# Patient Record
Sex: MALE | Race: WHITE | Employment: OTHER | URBAN - METROPOLITAN AREA
[De-identification: names, ages, dates, MRNs, and addresses within clinical notes are randomized per-mention and may not be internally consistent; named-entity substitution may affect disease eponyms.]

---

## 2018-01-26 ENCOUNTER — PATIENT OUTREACH (OUTPATIENT)
Dept: FAMILY MEDICINE CLINIC | Facility: CLINIC | Age: 63
End: 2018-01-26

## 2019-02-04 ENCOUNTER — PATIENT OUTREACH (OUTPATIENT)
Dept: FAMILY MEDICINE CLINIC | Facility: CLINIC | Age: 64
End: 2019-02-04

## 2019-03-27 ENCOUNTER — TELEPHONE (OUTPATIENT)
Dept: FAMILY MEDICINE CLINIC | Facility: CLINIC | Age: 64
End: 2019-03-27

## 2019-06-20 ENCOUNTER — TELEPHONE (OUTPATIENT)
Dept: FAMILY MEDICINE CLINIC | Facility: CLINIC | Age: 64
End: 2019-06-20

## 2019-06-20 NOTE — TELEPHONE ENCOUNTER
Patient is scheduled for a Pre-Op visit on 07/08/19 at 11:15 am.     Pre-Op: Hand injection on last week of July at [address] [facility] by Dr Princess Khalil,       MDs office will fax over pre-op orders.

## 2019-07-03 ENCOUNTER — OFFICE VISIT (OUTPATIENT)
Dept: FAMILY MEDICINE CLINIC | Facility: CLINIC | Age: 64
End: 2019-07-03
Payer: MEDICARE

## 2019-07-03 VITALS
SYSTOLIC BLOOD PRESSURE: 130 MMHG | DIASTOLIC BLOOD PRESSURE: 70 MMHG | BODY MASS INDEX: 29.18 KG/M2 | HEIGHT: 70 IN | RESPIRATION RATE: 20 BRPM | WEIGHT: 203.81 LBS | HEART RATE: 68 BPM | TEMPERATURE: 98 F

## 2019-07-03 DIAGNOSIS — M79.641 BILATERAL HAND PAIN: ICD-10-CM

## 2019-07-03 DIAGNOSIS — M79.642 BILATERAL HAND PAIN: ICD-10-CM

## 2019-07-03 DIAGNOSIS — M72.0 DUPUYTREN'S CONTRACTURE OF BOTH HANDS: Primary | ICD-10-CM

## 2019-07-03 DIAGNOSIS — Z01.818 PREOP EXAMINATION: ICD-10-CM

## 2019-07-03 PROCEDURE — 99214 OFFICE O/P EST MOD 30 MIN: CPT | Performed by: FAMILY MEDICINE

## 2019-07-03 PROCEDURE — 93000 ELECTROCARDIOGRAM COMPLETE: CPT | Performed by: FAMILY MEDICINE

## 2019-07-03 RX ORDER — DIAZEPAM 5 MG/1
TABLET ORAL
Refills: 0 | COMMUNITY
Start: 2019-05-20 | End: 2019-08-21 | Stop reason: ALTCHOICE

## 2019-07-03 RX ORDER — HYDROCODONE BITARTRATE AND ACETAMINOPHEN 5; 325 MG/1; MG/1
TABLET ORAL
Refills: 0 | COMMUNITY
Start: 2019-05-21 | End: 2019-08-21 | Stop reason: ALTCHOICE

## 2019-07-03 NOTE — PROGRESS NOTES
Abbie Kehr is a 66year old male who presents for a pre-operative physical exam. Patient is to have Xyloflex injections and lysis of adhesions of Dupytrens contracture,under anesthesia  to be done by Dr. Pallavi De Leon at [facility]  In July.       H Sitting, Cuff Size: large)   Pulse 68   Temp 98.1 °F (36.7 °C) (Temporal)   Resp 20   Ht 70\"   Wt 203 lb 12.8 oz   BMI 29.24 kg/m²   GENERAL: well developed, well nourished,in no apparent distress  SKIN: no rashes,no suspicious lesions  HEENT: atraumatic,

## 2019-07-15 ENCOUNTER — TELEPHONE (OUTPATIENT)
Dept: FAMILY MEDICINE CLINIC | Facility: CLINIC | Age: 64
End: 2019-07-15

## 2019-08-21 ENCOUNTER — OFFICE VISIT (OUTPATIENT)
Dept: FAMILY MEDICINE CLINIC | Facility: CLINIC | Age: 64
End: 2019-08-21
Payer: MEDICARE

## 2019-08-21 VITALS
RESPIRATION RATE: 16 BRPM | HEART RATE: 84 BPM | SYSTOLIC BLOOD PRESSURE: 130 MMHG | WEIGHT: 200.63 LBS | TEMPERATURE: 98 F | DIASTOLIC BLOOD PRESSURE: 80 MMHG | BODY MASS INDEX: 29 KG/M2

## 2019-08-21 DIAGNOSIS — J02.9 PHARYNGITIS, UNSPECIFIED ETIOLOGY: ICD-10-CM

## 2019-08-21 DIAGNOSIS — J01.20 ACUTE NON-RECURRENT ETHMOIDAL SINUSITIS: Primary | ICD-10-CM

## 2019-08-21 DIAGNOSIS — J34.89 SINUS PAIN: ICD-10-CM

## 2019-08-21 PROCEDURE — 99214 OFFICE O/P EST MOD 30 MIN: CPT | Performed by: FAMILY MEDICINE

## 2019-08-21 RX ORDER — METHYLPREDNISOLONE 4 MG/1
TABLET ORAL
Qty: 1 KIT | Refills: 0 | Status: SHIPPED | OUTPATIENT
Start: 2019-08-21 | End: 2020-01-30 | Stop reason: ALTCHOICE

## 2019-08-21 RX ORDER — AMOXICILLIN AND CLAVULANATE POTASSIUM 500; 125 MG/1; MG/1
1 TABLET, FILM COATED ORAL 2 TIMES DAILY
Qty: 20 TABLET | Refills: 0 | Status: SHIPPED | OUTPATIENT
Start: 2019-08-21 | End: 2019-08-31

## 2019-08-21 NOTE — PROGRESS NOTES
HPI:   Wilberto Zhang is a 66year old male who presents for upper respiratory symptoms for  1  weeks. Patient reports sore throat, congestion, dry cough, sinus pain.       Current Outpatient Medications:  Amoxicillin-Pot Clavulanate (AUGMENTIN) 500-125 MG Ora mucinex bid    Meds & Refills for this Visit:  Requested Prescriptions     Signed Prescriptions Disp Refills   • Amoxicillin-Pot Clavulanate (AUGMENTIN) 500-125 MG Oral Tab 20 tablet 0     Sig: Take 1 tablet by mouth 2 (two) times daily for 10 days.    • me

## 2019-08-27 ENCOUNTER — PATIENT OUTREACH (OUTPATIENT)
Dept: FAMILY MEDICINE CLINIC | Facility: CLINIC | Age: 64
End: 2019-08-27

## 2020-01-20 ENCOUNTER — PATIENT OUTREACH (OUTPATIENT)
Dept: FAMILY MEDICINE CLINIC | Facility: CLINIC | Age: 65
End: 2020-01-20

## 2020-01-30 ENCOUNTER — OFFICE VISIT (OUTPATIENT)
Dept: FAMILY MEDICINE CLINIC | Facility: CLINIC | Age: 65
End: 2020-01-30
Payer: COMMERCIAL

## 2020-01-30 VITALS
RESPIRATION RATE: 16 BRPM | TEMPERATURE: 98 F | SYSTOLIC BLOOD PRESSURE: 128 MMHG | HEIGHT: 69 IN | HEART RATE: 72 BPM | BODY MASS INDEX: 30.46 KG/M2 | DIASTOLIC BLOOD PRESSURE: 78 MMHG | WEIGHT: 205.63 LBS

## 2020-01-30 DIAGNOSIS — Z00.00 ENCOUNTER FOR MEDICARE ANNUAL WELLNESS EXAM: ICD-10-CM

## 2020-01-30 DIAGNOSIS — I65.23 CAROTID STENOSIS, BILATERAL: ICD-10-CM

## 2020-01-30 DIAGNOSIS — E78.2 MIXED HYPERLIPIDEMIA: ICD-10-CM

## 2020-01-30 DIAGNOSIS — M72.0 DUPUYTREN'S CONTRACTURE OF BOTH HANDS: ICD-10-CM

## 2020-01-30 DIAGNOSIS — Z00.00 ENCOUNTER FOR ANNUAL HEALTH EXAMINATION: ICD-10-CM

## 2020-01-30 DIAGNOSIS — K76.0 FATTY LIVER DISEASE, NONALCOHOLIC: ICD-10-CM

## 2020-01-30 DIAGNOSIS — Z13.6 SCREENING FOR CARDIOVASCULAR CONDITION: ICD-10-CM

## 2020-01-30 DIAGNOSIS — Z00.00 MEDICARE ANNUAL WELLNESS VISIT, INITIAL: Primary | ICD-10-CM

## 2020-01-30 DIAGNOSIS — Z11.59 NEED FOR HEPATITIS C SCREENING TEST: ICD-10-CM

## 2020-01-30 DIAGNOSIS — Z11.4 SCREENING FOR HIV (HUMAN IMMUNODEFICIENCY VIRUS): ICD-10-CM

## 2020-01-30 PROCEDURE — 96160 PT-FOCUSED HLTH RISK ASSMT: CPT | Performed by: FAMILY MEDICINE

## 2020-01-30 PROCEDURE — G0439 PPPS, SUBSEQ VISIT: HCPCS | Performed by: FAMILY MEDICINE

## 2020-01-30 PROCEDURE — 99396 PREV VISIT EST AGE 40-64: CPT | Performed by: FAMILY MEDICINE

## 2020-01-30 NOTE — PATIENT INSTRUCTIONS
Gamal Arguello's SCREENING SCHEDULE   Tests on this list are recommended by your physician but may not be covered, or covered at this frequency, by your insurer. Please check with your insurance carrier before scheduling to verify coverage.     PREVENTATIVE years No results found for this or any previous visit. No flowsheet data found. Fecal Occult Blood   Covered Annually No results found for: FOB, OCCULTSTOOL No flowsheet data found.      Barium Enema-   uncomfortable but covered  Covered but uncomfortab pharmacy  prescription benefits     Recommended Websites for Advanced Directives    [email or web address] pdf  An information packet, including necessary form from the [employer] website.      [email or web address] needed at [facility], and non-screening if indicated for medical reasons    Electrocardiogram date07/03/2019 Routine EKG is not a screening covered service except at the Welcome to Medicare Visit    Abdominal aortic aneurysm screening (once betwee birthday    Hepatitis B for Moderate/High Risk No orders found for this or any previous visit.  Medium/high risk factors:   End-stage renal disease   Hemophiliacs who received Factor VIII or IX concentrates   Clients of institutions for the mentally retarde

## 2020-01-30 NOTE — PROGRESS NOTES
HPI:   Joseph Canavan is a 60year old male who presents for a Medicare Initial Annual Wellness visit (Once after 12 month Medicare anniversary) .     Alverto Ashford is here for his [address],  He has has some ear issues and has some \" bumps\" and stye in his eyes, he has (Family Practice)    Patient Active Problem List:     Carotid stenosis, bilateral     Fatty liver disease, nonalcoholic     Hyperlipidemia     Dupuytren's contracture of both hands     Screening for HIV (human immunodeficiency virus)    Wt Readings from [email or web address] of allergy or asthma    EXAM:   /78 (BP Location: Right arm, Patient Position: Sitting, Cuff Size: large)   Pulse 72   Temp 98.2 °F (36.8 °C) (Temporal)   Resp 16   Ht 69\"   Wt 205 lb 9.6 oz (93.3 kg)   BMI 30.36 kg/m²   Estimated body mass index is and fingers, atraumatic, no cyanosis or edema   Pulses: 2+ and symmetric   Skin: Skin color, texture, turgor normal, no rashes or lesions, see above   Lymph nodes: Cervical, supraclavicular, and axillary nodes normal   Neurologic: Normal            Vaccina patient indicates understanding of these issues and agrees to the plan. Reinforced healthy diet, lifestyle, and exercise. Return in 6 months (on 7/30/2020).      Ivanna Clark DO, 1/30/2020     General Health     In the past six months, have you lost mo after 65 No vaccine history found Please get once after your 65th birthday    Pneumococcal 23 (Pneumovax)  Covered Once after 65 No vaccine history found Please get once after your 65th birthday    Hepatitis B for Moderate/High Risk No vaccine history foun

## 2020-03-02 ENCOUNTER — TELEPHONE (OUTPATIENT)
Dept: FAMILY MEDICINE CLINIC | Facility: CLINIC | Age: 65
End: 2020-03-02

## 2020-03-04 ENCOUNTER — TELEPHONE (OUTPATIENT)
Dept: FAMILY MEDICINE CLINIC | Facility: CLINIC | Age: 65
End: 2020-03-04

## 2020-03-04 DIAGNOSIS — S62.512A CLOSED DISPLACED FRACTURE OF PROXIMAL PHALANX OF LEFT THUMB, INITIAL ENCOUNTER: Primary | ICD-10-CM

## 2020-03-04 NOTE — TELEPHONE ENCOUNTER
Marlyn Juárez from Dr. Paola Vaz office called- pt needs referral to be seen today    In office for L Thumb Fracture    Routing to provider- please route back to Rn to Fax [phone]

## 2020-03-04 NOTE — TELEPHONE ENCOUNTER
Pt stopped in, he has changed from Medicare to [facility].   So-we put in the referral for Dr. Kelley Bustillos, now pt states he needs an order from Dr. Albert Gomez to Dr. Kelley Bustillos for a customized splint for thumb, as long as the splint will be covered by pts insurance

## 2020-03-05 NOTE — TELEPHONE ENCOUNTER
Pt called back today and was VERY UPSET. Pt said he was told DS would be calling him at the end of the day. This never happened, said he said at home all night waiting for this call. Pt spent 10 minutes on the phone with me expressing his anger about this.

## 2020-03-05 NOTE — TELEPHONE ENCOUNTER
Left message that we do not order specialty orthotics and braces, should be done by Ortho that he saw , Dr. Princess Khalil, their office was contacted and they will be ordering

## 2020-03-05 NOTE — TELEPHONE ENCOUNTER
Amy Reyes from [facility]. States she needs a referral for hand splint made by their occupational hand therapist.    Leroy Moraes that our office does referral for evaluation and treatment.  Any treatment they recommend their office will ne

## 2020-03-06 ENCOUNTER — TELEPHONE (OUTPATIENT)
Dept: FAMILY MEDICINE CLINIC | Facility: CLINIC | Age: 65
End: 2020-03-06

## 2020-03-06 NOTE — TELEPHONE ENCOUNTER
Ammon Johnson from [facility] office called. They are asking for a referral for pt to be seen.  Please call back at [phone] Left a message for patient to call the GI office to schedule the procedure.

## 2020-03-07 LAB
ABSOLUTE BASOPHILS: 43 CELLS/UL (ref 0–200)
ABSOLUTE EOSINOPHILS: 159 CELLS/UL (ref 15–500)
ABSOLUTE LYMPHOCYTES: 1665 CELLS/UL (ref 850–3900)
ABSOLUTE MONOCYTES: 519 CELLS/UL (ref 200–950)
ABSOLUTE NEUTROPHILS: 3715 CELLS/UL (ref 1500–7800)
ALBUMIN/GLOBULIN RATIO: 1.5 (CALC) (ref 1–2.5)
ALBUMIN: 4.3 G/DL (ref 3.6–5.1)
ALKALINE PHOSPHATASE: 67 U/L (ref 35–144)
ALT: 33 U/L (ref 9–46)
AST: 22 U/L (ref 10–35)
BASOPHILS: 0.7 %
BILIRUBIN, TOTAL: 0.5 MG/DL (ref 0.2–1.2)
BUN: 16 MG/DL (ref 7–25)
CALCIUM: 9.4 MG/DL (ref 8.6–10.3)
CARBON DIOXIDE: 25 MMOL/L (ref 20–32)
CHLORIDE: 103 MMOL/L (ref 98–110)
CHOL/HDLC RATIO: 5.3 (CALC)
CHOLESTEROL, TOTAL: 174 MG/DL
CREATININE: 0.79 MG/DL (ref 0.7–1.25)
EGFR IF AFRICN AM: 112 ML/MIN/1.73M2
EGFR IF NONAFRICN AM: 96 ML/MIN/1.73M2
EOSINOPHILS: 2.6 %
GLOBULIN: 2.9 G/DL (CALC) (ref 1.9–3.7)
GLUCOSE: 169 MG/DL (ref 65–99)
HDL CHOLESTEROL: 33 MG/DL
HEMATOCRIT: 43.9 % (ref 38.5–50)
HEMOGLOBIN: 15.6 G/DL (ref 13.2–17.1)
LYMPHOCYTES: 27.3 %
MCH: 32.2 PG (ref 27–33)
MCHC: 35.5 G/DL (ref 32–36)
MCV: 90.7 FL (ref 80–100)
MONOCYTES: 8.5 %
MPV: 10.8 FL (ref 7.5–12.5)
NEUTROPHILS: 60.9 %
NON-HDL CHOLESTEROL: 141 MG/DL (CALC)
PLATELET COUNT: 231 THOUSAND/UL (ref 140–400)
POTASSIUM: 4.7 MMOL/L (ref 3.5–5.3)
PROTEIN, TOTAL: 7.2 G/DL (ref 6.1–8.1)
PSA, TOTAL: 0.6 NG/ML
RDW: 12.7 % (ref 11–15)
RED BLOOD CELL COUNT: 4.84 MILLION/UL (ref 4.2–5.8)
SIGNAL TO CUT-OFF: 0.03
SODIUM: 136 MMOL/L (ref 135–146)
TRIGLYCERIDES: 407 MG/DL
TSH W/REFLEX TO FT4: 1.42 MIU/L (ref 0.4–4.5)
WHITE BLOOD CELL COUNT: 6.1 THOUSAND/UL (ref 3.8–10.8)

## 2020-03-09 ENCOUNTER — MED REC SCAN ONLY (OUTPATIENT)
Dept: FAMILY MEDICINE CLINIC | Facility: CLINIC | Age: 65
End: 2020-03-09

## 2020-03-10 ENCOUNTER — TELEPHONE (OUTPATIENT)
Dept: FAMILY MEDICINE CLINIC | Facility: CLINIC | Age: 65
End: 2020-03-10

## 2020-03-10 DIAGNOSIS — R73.09 ELEVATED GLUCOSE: Primary | ICD-10-CM

## 2020-03-10 NOTE — TELEPHONE ENCOUNTER
Notify Dave Hood Emerado  labs looked very good, except for a couple of things his Tchol looked good BUT HIS TG were really high 407, they should 150 or less  Had   Excellent kidney, liver function, thyroid, and prostate  were all normal.  His Hepatitis C was

## 2020-03-10 NOTE — TELEPHONE ENCOUNTER
Pt was advised of results verbalized understanding    Pt states he would like to go [facility] for labs - [facility] [place]

## 2020-03-14 ENCOUNTER — TELEPHONE (OUTPATIENT)
Dept: FAMILY MEDICINE CLINIC | Facility: CLINIC | Age: 65
End: 2020-03-14

## 2020-03-14 DIAGNOSIS — E11.9 TYPE 2 DIABETES MELLITUS WITHOUT COMPLICATION, WITHOUT LONG-TERM CURRENT USE OF INSULIN (HCC): Primary | ICD-10-CM

## 2020-03-14 LAB — HEMOGLOBIN A1C: 7.1 % OF TOTAL HGB

## 2020-03-14 NOTE — TELEPHONE ENCOUNTER
Notes recorded by Citlalli Cope DO on 3/14/2020 at 7:08 AM CDT  He also needs to be on something to lower his cholesterol/ TG if he agrees we can start some tricor.  Also he should get his eyes check to make sure he has no diabetic retina issues  ------

## 2020-03-14 NOTE — TELEPHONE ENCOUNTER
Patient returned call. Patient notified and verbalized understanding. States he will research the cholesterol medication and let DS know next week if he wants to start it due to s/e to several meds.     Patient is agreeable to diabetic education  Order p

## 2020-03-16 ENCOUNTER — TELEPHONE (OUTPATIENT)
Dept: ENDOCRINOLOGY CLINIC | Facility: CLINIC | Age: 65
End: 2020-03-16

## 2020-03-16 NOTE — TELEPHONE ENCOUNTER
Pt states he doesn't want to take any medications    He stopped drinking soda as of Saturday- and he wants to start taking Triple Garlic a day    Pt staes he will be visiting the [facility] area and will be doing a lot more exercise and that will be goo

## 2020-03-18 ENCOUNTER — TELEPHONE (OUTPATIENT)
Dept: FAMILY MEDICINE CLINIC | Facility: CLINIC | Age: 65
End: 2020-03-18

## 2020-03-18 NOTE — TELEPHONE ENCOUNTER
cologuard testing came back negative- pt good for 3 years    Pt advised of results- verbalized understanding    Updated in Epic- sent to scanning

## 2020-03-19 ENCOUNTER — MED REC SCAN ONLY (OUTPATIENT)
Dept: FAMILY MEDICINE CLINIC | Facility: CLINIC | Age: 65
End: 2020-03-19

## 2020-05-06 ENCOUNTER — TELEPHONE (OUTPATIENT)
Dept: FAMILY MEDICINE CLINIC | Facility: CLINIC | Age: 65
End: 2020-05-06

## 2020-05-06 NOTE — TELEPHONE ENCOUNTER
Pt declines using computer/smart phone for video visit. Is trying to quit sweets. However, he did let me speak to his girlfriend Angelica. Emailed him an invitation to sign up for [employer].  Will call them again on Friday to see if they've established a

## 2020-05-06 NOTE — TELEPHONE ENCOUNTER
Fax from [facility] requesting additional Dx codes for labs drawn 3/6/2020    Additional Dx code E11.9 DM2 and E78.2 hyperlipidemia added    Form faxed back

## 2020-05-08 NOTE — TELEPHONE ENCOUNTER
Attempt #2, girlfriend Roxana Armandos hasn't had a chance to set up [employer] yet. Will try him again Tues or Wed.

## 2020-05-13 NOTE — TELEPHONE ENCOUNTER
[facility] and sched video visit once gf Farndy Napoles gets him set up on [employer] bab[employer]. Third/final attempt.

## 2020-06-18 ENCOUNTER — TELEPHONE (OUTPATIENT)
Dept: FAMILY MEDICINE CLINIC | Facility: CLINIC | Age: 65
End: 2020-06-18

## 2020-08-18 ENCOUNTER — MED REC SCAN ONLY (OUTPATIENT)
Dept: FAMILY MEDICINE CLINIC | Facility: CLINIC | Age: 65
End: 2020-08-18

## 2020-08-18 DIAGNOSIS — H35.30 MACULAR DEGENERATION OF BOTH EYES, UNSPECIFIED TYPE: Primary | ICD-10-CM

## 2020-09-14 ENCOUNTER — TELEPHONE (OUTPATIENT)
Dept: FAMILY MEDICINE CLINIC | Facility: CLINIC | Age: 65
End: 2020-09-14

## 2020-09-14 DIAGNOSIS — H25.13 NUCLEAR SCLEROTIC CATARACT OF BOTH EYES: ICD-10-CM

## 2020-09-14 DIAGNOSIS — H35.9 UNSPECIFIED RETINAL DISORDER: Primary | ICD-10-CM

## 2020-09-14 NOTE — TELEPHONE ENCOUNTER
Pt is going to the office this Friday for consult visit    Was referred by [facility] for Unspecified retinal disorder H 35.9

## 2020-09-23 ENCOUNTER — TELEPHONE (OUTPATIENT)
Dept: FAMILY MEDICINE CLINIC | Facility: CLINIC | Age: 65
End: 2020-09-23

## 2020-09-23 NOTE — TELEPHONE ENCOUNTER
Jh Barone from [facility] called, checking status of the referral for pt's visit with Dr. Carolene Runner on 9/18/20. Please call Jh Barone at [phone].    E#[phone]

## 2020-09-28 NOTE — TELEPHONE ENCOUNTER
Tita from Dr[facility] office called, still need the referral.  Please call Gildardo Arroyo at [phone].

## 2020-09-28 NOTE — TELEPHONE ENCOUNTER
RN spoke with Gonzalo Balderas- verified fax number    She is going to fax back receipt  To confirm it was recieved

## 2020-11-17 ENCOUNTER — TELEPHONE (OUTPATIENT)
Dept: FAMILY MEDICINE CLINIC | Facility: CLINIC | Age: 65
End: 2020-11-17

## 2020-11-17 DIAGNOSIS — M72.0 DUPUYTREN'S CONTRACTURE OF BOTH HANDS: Primary | ICD-10-CM

## 2020-11-17 NOTE — TELEPHONE ENCOUNTER
Pt called to see if he needs labs- looks like we needed to recheck A1c- if other labs are needed please advise

## 2020-11-17 NOTE — TELEPHONE ENCOUNTER
PT CALLED AND ADV NEEDS NEW REFERRAL FOR DR Lorna Segundo. PT HAS UP COMING APT ON 12/2    PLEASE CALL IF ANY ISSUES.     Mady Long

## 2020-12-01 ENCOUNTER — NURSE ONLY (OUTPATIENT)
Dept: FAMILY MEDICINE CLINIC | Facility: CLINIC | Age: 65
End: 2020-12-01
Payer: COMMERCIAL

## 2020-12-01 DIAGNOSIS — R73.09 ELEVATED GLUCOSE: ICD-10-CM

## 2020-12-01 DIAGNOSIS — Z00.00 ENCOUNTER FOR MEDICARE ANNUAL WELLNESS EXAM: ICD-10-CM

## 2020-12-01 DIAGNOSIS — Z11.4 SCREENING FOR HIV (HUMAN IMMUNODEFICIENCY VIRUS): ICD-10-CM

## 2020-12-01 DIAGNOSIS — Z13.6 SCREENING FOR CARDIOVASCULAR CONDITION: ICD-10-CM

## 2020-12-01 DIAGNOSIS — Z00.00 ENCOUNTER FOR ANNUAL HEALTH EXAMINATION: ICD-10-CM

## 2020-12-01 DIAGNOSIS — Z11.59 NEED FOR HEPATITIS C SCREENING TEST: ICD-10-CM

## 2020-12-01 PROCEDURE — 87389 HIV-1 AG W/HIV-1&-2 AB AG IA: CPT | Performed by: FAMILY MEDICINE

## 2020-12-01 PROCEDURE — 80061 LIPID PANEL: CPT | Performed by: FAMILY MEDICINE

## 2020-12-01 PROCEDURE — 84443 ASSAY THYROID STIM HORMONE: CPT | Performed by: FAMILY MEDICINE

## 2020-12-01 PROCEDURE — 83721 ASSAY OF BLOOD LIPOPROTEIN: CPT | Performed by: FAMILY MEDICINE

## 2020-12-01 PROCEDURE — 83036 HEMOGLOBIN GLYCOSYLATED A1C: CPT | Performed by: FAMILY MEDICINE

## 2020-12-01 PROCEDURE — 80053 COMPREHEN METABOLIC PANEL: CPT | Performed by: FAMILY MEDICINE

## 2020-12-01 PROCEDURE — 85025 COMPLETE CBC W/AUTO DIFF WBC: CPT | Performed by: FAMILY MEDICINE

## 2020-12-01 PROCEDURE — 86803 HEPATITIS C AB TEST: CPT | Performed by: FAMILY MEDICINE

## 2020-12-01 NOTE — PROGRESS NOTES
Pt here for blood work ordered by DS. 2 golds, 1 light green, 1 pink & 1 purple were drawn from right arm. Pt left in good condition.

## 2020-12-02 ENCOUNTER — TELEPHONE (OUTPATIENT)
Dept: FAMILY MEDICINE CLINIC | Facility: CLINIC | Age: 65
End: 2020-12-02

## 2020-12-02 DIAGNOSIS — E78.2 MIXED HYPERLIPIDEMIA: Primary | ICD-10-CM

## 2020-12-02 DIAGNOSIS — E11.9 TYPE 2 DIABETES MELLITUS WITHOUT COMPLICATION, WITHOUT LONG-TERM CURRENT USE OF INSULIN (HCC): ICD-10-CM

## 2020-12-02 RX ORDER — ATORVASTATIN CALCIUM 10 MG/1
10 TABLET, FILM COATED ORAL NIGHTLY
Qty: 30 TABLET | Refills: 3 | Status: SHIPPED | OUTPATIENT
Start: 2020-12-02 | End: 2021-01-01 | Stop reason: WASHOUT

## 2020-12-02 NOTE — TELEPHONE ENCOUNTER
Pt states he is taking nothing for diabetes and pt is managing cholesterol with garlic and a vitamin pack    He states he bought the generic garlic pack this time.   RN states last year triglycerides were 407 and this year they are 445- Pt then agreed that

## 2020-12-02 NOTE — TELEPHONE ENCOUNTER
----- Message from Earl Landrum DO sent at 12/2/2020 10:27 AM CST -----  Cna notify Idalia Olivera, his HIV and Hepatitis C are negative, his kidney liver, thyroid and prostate all looked good , his BS control is not terrible at 7.2, his his cholesterol was not ve

## 2020-12-02 NOTE — TELEPHONE ENCOUNTER
Pt was advised of recommendation below- verbalized understanding    Is agreeable to starting Statin medication.     Pt already struggle with leg cramps- wants DS to be aware of that    Jim loja 34/47

## 2020-12-02 NOTE — TELEPHONE ENCOUNTER
SO his TChol, because his a diabetic should be 170 or less, and his LDL should be 70 or less, and his TG should be closer to 150, unfortunately there are not supplements that are going to accomplish that for him.  And that's why we use statins, to help decr

## 2020-12-03 ENCOUNTER — TELEPHONE (OUTPATIENT)
Dept: FAMILY MEDICINE CLINIC | Facility: CLINIC | Age: 65
End: 2020-12-03

## 2020-12-03 NOTE — TELEPHONE ENCOUNTER
PT CALLED AND SCHEDULED PRE-OP W/DR ON 12/15/20.  PT HAVING HAND PROCEDURE WITH DR Laurel Gonzalez -NO DATE IS SET (WILL BE IN December)    [address]

## 2020-12-03 NOTE — TELEPHONE ENCOUNTER
He is asking if another doctor tp do a pre op physical for him he is able to have the surgery on Dec.8 please advise  He is available anytime on Friday to come in

## 2020-12-03 NOTE — TELEPHONE ENCOUNTER
Pt called to advise that he will not be moving up his surgery. He said disregard the request to see another MD for pre op.      He will keep his original pre op appointment    Future Appointments   Date Time Provider Sonia Patel   12/15/2020 11:00 AM

## 2020-12-15 ENCOUNTER — OFFICE VISIT (OUTPATIENT)
Dept: FAMILY MEDICINE CLINIC | Facility: CLINIC | Age: 65
End: 2020-12-15
Payer: COMMERCIAL

## 2020-12-15 ENCOUNTER — MED REC SCAN ONLY (OUTPATIENT)
Dept: FAMILY MEDICINE CLINIC | Facility: CLINIC | Age: 65
End: 2020-12-15

## 2020-12-15 VITALS
WEIGHT: 205.81 LBS | BODY MASS INDEX: 30.48 KG/M2 | HEART RATE: 75 BPM | DIASTOLIC BLOOD PRESSURE: 70 MMHG | SYSTOLIC BLOOD PRESSURE: 130 MMHG | HEIGHT: 69 IN | TEMPERATURE: 97 F | OXYGEN SATURATION: 95 %

## 2020-12-15 DIAGNOSIS — Z01.818 PREOP EXAMINATION: Primary | ICD-10-CM

## 2020-12-15 DIAGNOSIS — M72.0 DUPUYTREN'S CONTRACTURE OF BOTH HANDS: ICD-10-CM

## 2020-12-15 DIAGNOSIS — M72.0 CONTRACTURE OF PALMAR FASCIA: ICD-10-CM

## 2020-12-15 DIAGNOSIS — M72.0 DUPUYTREN'S CONTRACTURE: ICD-10-CM

## 2020-12-15 PROBLEM — M79.609 PAIN IN SOFT TISSUES OF LIMB: Status: ACTIVE | Noted: 2020-03-04

## 2020-12-15 PROCEDURE — 3008F BODY MASS INDEX DOCD: CPT | Performed by: FAMILY MEDICINE

## 2020-12-15 PROCEDURE — 99214 OFFICE O/P EST MOD 30 MIN: CPT | Performed by: FAMILY MEDICINE

## 2020-12-15 PROCEDURE — 3075F SYST BP GE 130 - 139MM HG: CPT | Performed by: FAMILY MEDICINE

## 2020-12-15 PROCEDURE — 3078F DIAST BP <80 MM HG: CPT | Performed by: FAMILY MEDICINE

## 2020-12-15 PROCEDURE — 93000 ELECTROCARDIOGRAM COMPLETE: CPT | Performed by: FAMILY MEDICINE

## 2020-12-15 NOTE — PROGRESS NOTES
Humberto Joseph is a 63year old male who presents for a pre-operative physical exam. Patient is to have Xiaflex injection, MP PIP joint left ring finger and repair tendon left thumb, to be done by Dr. Irene Cates at [facility] headaches  PSYCHE: denies depression or anxiety  HEMATOLOGIC: denies hx of anemia  ENDOCRINE: denies thyroid history  ALL/ASTHMA: denies hx of allergy or asthma    EXAM:   /70   Pulse 75   Temp 97 °F (36.1 °C) (Temporal)   Ht 5' 9\" (1.753 m)   Wt 20

## 2020-12-23 ENCOUNTER — TELEPHONE (OUTPATIENT)
Dept: FAMILY MEDICINE CLINIC | Facility: CLINIC | Age: 65
End: 2020-12-23

## 2020-12-23 DIAGNOSIS — M72.0 CONTRACTURE OF PALMAR FASCIA: ICD-10-CM

## 2020-12-23 DIAGNOSIS — M72.0 DUPUYTREN'S CONTRACTURE OF BOTH HANDS: Primary | ICD-10-CM

## 2020-12-23 NOTE — TELEPHONE ENCOUNTER
[facility] faxed over order for pt to have PT- Dr. Jorge Olsen put in referral     Fax sent to scanning

## 2021-01-06 ENCOUNTER — MED REC SCAN ONLY (OUTPATIENT)
Dept: FAMILY MEDICINE CLINIC | Facility: CLINIC | Age: 66
End: 2021-01-06

## 2021-01-15 ENCOUNTER — TELEPHONE (OUTPATIENT)
Dept: FAMILY MEDICINE CLINIC | Facility: CLINIC | Age: 66
End: 2021-01-15

## 2021-01-15 NOTE — TELEPHONE ENCOUNTER
Pt called he is wanting to know if he should go to  refill of  atorvastatin 10 MG Oral Tab ? He said the dr wanted to make sure the medication was working, he was in last month to do labs work.      Pt advised  is out of office

## 2021-01-15 NOTE — TELEPHONE ENCOUNTER
Patient advised of the information per . Patient verbalized understanding. He has a refill ready at the pharmacy. He will pick it up.

## 2021-04-01 ENCOUNTER — TELEPHONE (OUTPATIENT)
Dept: FAMILY MEDICINE CLINIC | Facility: CLINIC | Age: 66
End: 2021-04-01

## 2021-04-14 PROCEDURE — 3051F HG A1C>EQUAL 7.0%<8.0%: CPT | Performed by: FAMILY MEDICINE

## 2021-04-15 ENCOUNTER — TELEPHONE (OUTPATIENT)
Dept: FAMILY MEDICINE CLINIC | Facility: CLINIC | Age: 66
End: 2021-04-15

## 2021-04-15 DIAGNOSIS — E11.9 TYPE 2 DIABETES MELLITUS WITHOUT COMPLICATION, WITHOUT LONG-TERM CURRENT USE OF INSULIN (HCC): Primary | ICD-10-CM

## 2021-04-15 DIAGNOSIS — E78.2 MIXED HYPERLIPIDEMIA: ICD-10-CM

## 2021-04-15 RX ORDER — ATORVASTATIN CALCIUM 10 MG/1
TABLET, FILM COATED ORAL
COMMUNITY
Start: 2021-03-22 | End: 2021-04-26

## 2021-04-15 NOTE — TELEPHONE ENCOUNTER
Pt was advised of results- verbalized understanding    Pt states he is not consistent with taking medication- pt will try more to take medication consistently.     Future orders placed with recall

## 2021-04-15 NOTE — TELEPHONE ENCOUNTER
----- Message from Rosalinda Roldan DO sent at 4/15/2021  9:33 AM CDT -----  Can notify His A1c looks really good, his lipids looked good except for his TG were still really high, is he still taking the atorvastatin?  Lets recall A1c in 6 months along with lip

## 2021-04-15 NOTE — TELEPHONE ENCOUNTER
External labs received from [facility]    Entered as external results- copy sent to scanning

## 2021-04-26 RX ORDER — ATORVASTATIN CALCIUM 10 MG/1
TABLET, FILM COATED ORAL
Qty: 30 TABLET | Refills: 2 | Status: SHIPPED | OUTPATIENT
Start: 2021-04-26 | End: 2021-09-02

## 2021-04-26 NOTE — TELEPHONE ENCOUNTER
Cholesterol Medication Protocol Atuzoz6404/26/2021 03:11 AM   ALT < 80 Protocol Details    ALT resulted within past year     Lipid panel within past 12 months     Appointment within past 12 or next 3 months      Refilled per protocol.

## 2021-04-29 ENCOUNTER — TELEPHONE (OUTPATIENT)
Dept: FAMILY MEDICINE CLINIC | Facility: CLINIC | Age: 66
End: 2021-04-29

## 2021-05-17 ENCOUNTER — TELEPHONE (OUTPATIENT)
Dept: FAMILY MEDICINE CLINIC | Facility: CLINIC | Age: 66
End: 2021-05-17

## 2021-05-17 NOTE — TELEPHONE ENCOUNTER
Reached patient for medication adherence consult. Patient is past due for refill on atorvastatin. He tells me he is currently traveling but does still have some atorvastatin left and is still taking it.  Patient does admit to sometimes forgetting or mis [Abdomen Masses] : No abdominal masses [Abdomen Tenderness] : ~T No ~M abdominal tenderness [No HSM] : no hepatosplenomegaly [JVD] : no jugular venous distention  [Normal Breath Sounds] : Normal breath sounds [Normal Heart Sounds] : normal heart sounds [No Rash or Lesion] : No rash or lesion

## 2021-06-08 ENCOUNTER — TELEPHONE (OUTPATIENT)
Dept: FAMILY MEDICINE CLINIC | Facility: CLINIC | Age: 66
End: 2021-06-08

## 2021-06-09 ENCOUNTER — OFFICE VISIT (OUTPATIENT)
Dept: FAMILY MEDICINE CLINIC | Facility: CLINIC | Age: 66
End: 2021-06-09
Payer: COMMERCIAL

## 2021-06-09 VITALS
HEIGHT: 70 IN | HEART RATE: 76 BPM | BODY MASS INDEX: 29.44 KG/M2 | TEMPERATURE: 98 F | SYSTOLIC BLOOD PRESSURE: 134 MMHG | DIASTOLIC BLOOD PRESSURE: 70 MMHG | WEIGHT: 205.63 LBS | RESPIRATION RATE: 18 BRPM

## 2021-06-09 DIAGNOSIS — I65.23 CAROTID STENOSIS, BILATERAL: ICD-10-CM

## 2021-06-09 DIAGNOSIS — E78.2 MIXED HYPERLIPIDEMIA: ICD-10-CM

## 2021-06-09 DIAGNOSIS — Z00.00 MEDICARE ANNUAL WELLNESS VISIT, SUBSEQUENT: Primary | ICD-10-CM

## 2021-06-09 DIAGNOSIS — Z12.5 PROSTATE CANCER SCREENING: ICD-10-CM

## 2021-06-09 DIAGNOSIS — Z00.00 ENCOUNTER FOR ANNUAL HEALTH EXAMINATION: ICD-10-CM

## 2021-06-09 DIAGNOSIS — K76.0 FATTY LIVER DISEASE, NONALCOHOLIC: ICD-10-CM

## 2021-06-09 PROCEDURE — 99396 PREV VISIT EST AGE 40-64: CPT | Performed by: FAMILY MEDICINE

## 2021-06-09 PROCEDURE — 96160 PT-FOCUSED HLTH RISK ASSMT: CPT | Performed by: FAMILY MEDICINE

## 2021-06-09 PROCEDURE — 3008F BODY MASS INDEX DOCD: CPT | Performed by: FAMILY MEDICINE

## 2021-06-09 PROCEDURE — 3075F SYST BP GE 130 - 139MM HG: CPT | Performed by: FAMILY MEDICINE

## 2021-06-09 PROCEDURE — 3078F DIAST BP <80 MM HG: CPT | Performed by: FAMILY MEDICINE

## 2021-06-09 PROCEDURE — G0439 PPPS, SUBSEQ VISIT: HCPCS | Performed by: FAMILY MEDICINE

## 2021-06-09 NOTE — PROGRESS NOTES
HPI:   Dixie Muro is a 63year old male who presents for a Medicare Subsequent Annual Wellness visit (Pt already had Initial Annual Wellness).     [place] is here for his [address], he had lipids done in April that showed his total and LDL to be good but his T oz (93.3 kg)  08/21/19 : 200 lb 9.6 oz (91 kg)     Last Cholesterol Labs:   Lab Results   Component Value Date    CHOLEST 173 04/14/2021    HDL 33 04/14/2021    LDL  12/01/2020      Comment:      Unable to calculate LDL due to Triglycerides >400.0 mg/dL denies thyroid history  ALL/ASTHMA: denies hx of allergy or asthma    EXAM:   There were no vitals taken for this visit. Estimated body mass index is 30.39 kg/m² as calculated from the following:    Height as of 12/15/20: 5' 9\" (1.753 m).     Weight as of presents for a Medicare Assessment.      PLAN SUMMARY:   Diagnoses and all orders for this visit:    Medicare annual wellness visit, subsequent  -     PSA SCREEN; Future  -     COMP METABOLIC PANEL (14)  -     TSH W REFLEX TO FREE T4  -     CBC WITH DIFFERE (H) 12/01/2020        Cardiovascular Disease Screening    Lipid Panel  Cholesterol  Lipoprotein (HDL)  Triglycerides Covered every 5 years for all Medicare beneficiaries without apparent signs or symptoms of cardiovascular disease Lab Results   Component V Pertusis TD and TDaP Not covered by Medicare Part B -  No recommendations at this time    Zoster Not covered by Medicare Part B; may be covered with your pharmacy  prescription benefits -  Zoster Vaccines(1 of 2) Never done       Diabetes      Hemoglobin A

## 2021-06-09 NOTE — PATIENT INSTRUCTIONS
[address] SCREENING SCHEDULE   Tests on this list are recommended by your physician but may not be covered, or covered at this frequency, by your insurer. Please check with your insurance carrier before scheduling to verify coverage.    PREVENTATI due on 12/01/2022   Immunizations    Influenza Covered once per flu season  Please get every year -  No recommendations at this time    Pneumococcal Each vaccine ([id number] & Spkudzqab75) covered once after 65 Prevnar 13: -    Pnayleeqv03: -     Pneumococc computer and printer. (the forms are also available in [place]  www. putitinwriting. org  This link also has information from the [facility] regarding Advance Directives.

## 2021-07-09 ENCOUNTER — TELEPHONE (OUTPATIENT)
Dept: FAMILY MEDICINE CLINIC | Facility: CLINIC | Age: 66
End: 2021-07-09

## 2021-07-09 NOTE — TELEPHONE ENCOUNTER
Overdue result letter mailed to patient   Lab Frequency Next Occurrence   HEMOGLOBIN A1C Once 10/15/2021   LIPID PANEL Once 10/15/2021   MICROALB/CREAT RATIO, RANDOM URINE Once 10/15/2021   PSA SCREEN Once 06/09/2021

## 2021-08-06 PROCEDURE — 3052F HG A1C>EQUAL 8.0%<EQUAL 9.0%: CPT | Performed by: FAMILY MEDICINE

## 2021-08-09 ENCOUNTER — TELEPHONE (OUTPATIENT)
Dept: FAMILY MEDICINE CLINIC | Facility: CLINIC | Age: 66
End: 2021-08-09

## 2021-08-10 ENCOUNTER — TELEPHONE (OUTPATIENT)
Dept: FAMILY MEDICINE CLINIC | Facility: CLINIC | Age: 66
End: 2021-08-10

## 2021-08-10 LAB
ABSOLUTE BASOPHILS: 69 CELLS/UL (ref 0–200)
ABSOLUTE EOSINOPHILS: 198 CELLS/UL (ref 15–500)
ABSOLUTE LYMPHOCYTES: 2726 CELLS/UL (ref 850–3900)
ABSOLUTE MONOCYTES: 628 CELLS/UL (ref 200–950)
ABSOLUTE NEUTROPHILS: 4979 CELLS/UL (ref 1500–7800)
ALBUMIN/GLOBULIN RATIO: 1.4 (CALC) (ref 1–2.5)
ALBUMIN: 4.6 G/DL (ref 3.6–5.1)
ALKALINE PHOSPHATASE: 72 U/L (ref 35–144)
ALT: 44 U/L (ref 9–46)
AST: 31 U/L (ref 10–35)
BASOPHILS: 0.8 %
BILIRUBIN, TOTAL: 0.8 MG/DL (ref 0.2–1.2)
BUN: 13 MG/DL (ref 7–25)
CALCIUM: 9.7 MG/DL (ref 8.6–10.3)
CARBON DIOXIDE: 23 MMOL/L (ref 20–32)
CHLORIDE: 101 MMOL/L (ref 98–110)
CREATININE: 0.81 MG/DL (ref 0.7–1.25)
EGFR IF AFRICN AM: 110 ML/MIN/1.73M2
EGFR IF NONAFRICN AM: 95 ML/MIN/1.73M2
EOSINOPHILS: 2.3 %
GLOBULIN: 3.2 G/DL (CALC) (ref 1.9–3.7)
GLUCOSE: 181 MG/DL (ref 65–99)
HEMATOCRIT: 48.1 % (ref 38.5–50)
HEMOGLOBIN A1C: 8 % OF TOTAL HGB
HEMOGLOBIN: 16.5 G/DL (ref 13.2–17.1)
LYMPHOCYTES: 31.7 %
MCH: 31.6 PG (ref 27–33)
MCHC: 34.3 G/DL (ref 32–36)
MCV: 92.1 FL (ref 80–100)
MONOCYTES: 7.3 %
MPV: 11 FL (ref 7.5–12.5)
NEUTROPHILS: 57.9 %
PLATELET COUNT: 245 THOUSAND/UL (ref 140–400)
POTASSIUM: 4.4 MMOL/L (ref 3.5–5.3)
PROTEIN, TOTAL: 7.8 G/DL (ref 6.1–8.1)
RDW: 12.8 % (ref 11–15)
RED BLOOD CELL COUNT: 5.22 MILLION/UL (ref 4.2–5.8)
SODIUM: 136 MMOL/L (ref 135–146)
TSH W/REFLEX TO FT4: 2.42 MIU/L (ref 0.4–4.5)
WHITE BLOOD CELL COUNT: 8.6 THOUSAND/UL (ref 3.8–10.8)

## 2021-08-10 NOTE — TELEPHONE ENCOUNTER
----- Message from Ainsley Tam DO sent at 8/10/2021  8:13 AM CDT -----  Britt Clements his A1c went up to 8.0, which means his BS average is running  160-180, which is too high. I think we need to get him started on something before it gets too high.  Also

## 2021-08-11 NOTE — TELEPHONE ENCOUNTER
Pt was advised of recommendation below- was agreeable to starting Metformin    RN sent to pharmacy    Future orders placed with recall

## 2021-08-16 ENCOUNTER — OFFICE VISIT (OUTPATIENT)
Dept: FAMILY MEDICINE CLINIC | Facility: CLINIC | Age: 66
End: 2021-08-16
Payer: COMMERCIAL

## 2021-08-16 VITALS
BODY MASS INDEX: 29 KG/M2 | WEIGHT: 205.19 LBS | DIASTOLIC BLOOD PRESSURE: 70 MMHG | RESPIRATION RATE: 24 BRPM | SYSTOLIC BLOOD PRESSURE: 122 MMHG | TEMPERATURE: 99 F | HEART RATE: 92 BPM

## 2021-08-16 DIAGNOSIS — M72.2 DUPUYTREN'S CONTRACTURE OF FOOT: ICD-10-CM

## 2021-08-16 DIAGNOSIS — L89.890 PRESSURE INJURY OF TOE OF LEFT FOOT, UNSTAGEABLE (HCC): Primary | ICD-10-CM

## 2021-08-16 DIAGNOSIS — E11.9 TYPE 2 DIABETES MELLITUS WITHOUT COMPLICATION, WITHOUT LONG-TERM CURRENT USE OF INSULIN (HCC): ICD-10-CM

## 2021-08-16 PROCEDURE — 99213 OFFICE O/P EST LOW 20 MIN: CPT | Performed by: FAMILY MEDICINE

## 2021-08-16 PROCEDURE — 3074F SYST BP LT 130 MM HG: CPT | Performed by: FAMILY MEDICINE

## 2021-08-16 PROCEDURE — 3078F DIAST BP <80 MM HG: CPT | Performed by: FAMILY MEDICINE

## 2021-08-16 NOTE — PROGRESS NOTES
Rodrick Praful is a 63year old male.   HPI:   Fabio Go is here for evaluation of  His left great toe, years ago he dropped a heavy part on it and broke, the toe and the Metatarsal?  He has seema wearing his gym shoes a lot as of late and notes there is more pain and throat are clear  NECK: supple,no adenopathy,no bruits  LUNGS: clear to auscultation  CARDIO: RRR without murmur  GI: good BS's,no masses, HSM or tenderness  EXTREMITIES: no cyanosis, clubbing or edema, has very strong DP and PT puilses, has visible co

## 2021-08-17 ENCOUNTER — TELEPHONE (OUTPATIENT)
Dept: FAMILY MEDICINE CLINIC | Facility: CLINIC | Age: 66
End: 2021-08-17

## 2021-08-17 NOTE — TELEPHONE ENCOUNTER
Pt called he wanting to know the name of podiatrist that Dr. Jorge Olsen referred him to. Gave him the information but he is wanting to know if that is someone in his network?  Advised him to call his insurance to verify but he states that was something that the

## 2021-08-17 NOTE — TELEPHONE ENCOUNTER
Pt was provided with name and phone number for Dr. Alphonso Terrell.     Pt states he will call insurance to make sure she is in network- if so then he will make a visit

## 2021-08-18 ENCOUNTER — TELEPHONE (OUTPATIENT)
Dept: ORTHOPEDICS CLINIC | Facility: CLINIC | Age: 66
End: 2021-08-18

## 2021-08-18 DIAGNOSIS — M79.672 LEFT FOOT PAIN: Primary | ICD-10-CM

## 2021-08-18 NOTE — TELEPHONE ENCOUNTER
Requisite for xray order  Reviewed patients chart, xray orders are required.  Order placed for left foot xrays

## 2021-08-18 NOTE — TELEPHONE ENCOUNTER
Please enter an Xray RX for a  LT GREAT TOE for  this patient’s upcoming appointment, as the patient has not had any imaging as of yet.   Patient was instructed to get X-rays before appt, so they will be calling [phone] to get scheduled before their ap

## 2021-08-26 ENCOUNTER — TELEPHONE (OUTPATIENT)
Dept: FAMILY MEDICINE CLINIC | Facility: CLINIC | Age: 66
End: 2021-08-26

## 2021-08-26 ENCOUNTER — HOSPITAL ENCOUNTER (OUTPATIENT)
Dept: GENERAL RADIOLOGY | Age: 66
Discharge: HOME | End: 2021-08-26
Attending: PODIATRIST
Payer: MEDICARE

## 2021-08-26 DIAGNOSIS — M79.672 LEFT FOOT PAIN: ICD-10-CM

## 2021-08-26 PROCEDURE — 73630 X-RAY EXAM OF FOOT: CPT | Performed by: PODIATRIST

## 2021-08-26 NOTE — TELEPHONE ENCOUNTER
MALLORIE - XRAY TECH CALLED PT AND ADV THAT HE NEEDS TO COME BACK FOR MORE ADDITIONAL VIEW.    NO APPT NEEDED       THANK YOU

## 2021-08-31 ENCOUNTER — HOSPITAL ENCOUNTER (OUTPATIENT)
Dept: GENERAL RADIOLOGY | Age: 66
Discharge: HOME | End: 2021-08-31
Attending: PODIATRIST
Payer: MEDICARE

## 2021-08-31 DIAGNOSIS — M79.672 LEFT FOOT PAIN: ICD-10-CM

## 2021-08-31 PROCEDURE — 73630 X-RAY EXAM OF FOOT: CPT | Performed by: PODIATRIST

## 2021-09-02 RX ORDER — ATORVASTATIN CALCIUM 10 MG/1
TABLET, FILM COATED ORAL
Qty: 30 TABLET | Refills: 2 | Status: SHIPPED | OUTPATIENT
Start: 2021-09-02 | End: 2021-12-01

## 2021-09-10 ENCOUNTER — OFFICE VISIT (OUTPATIENT)
Dept: ORTHOPEDICS CLINIC | Facility: CLINIC | Age: 66
End: 2021-09-10
Payer: COMMERCIAL

## 2021-09-10 DIAGNOSIS — L97.521 ULCER OF LEFT FOOT, LIMITED TO BREAKDOWN OF SKIN (HCC): ICD-10-CM

## 2021-09-10 DIAGNOSIS — M72.2 PLANTAR FIBROMATOSIS: ICD-10-CM

## 2021-09-10 DIAGNOSIS — M24.575 CONTRACTURE OF JOINT OF BOTH FEET: ICD-10-CM

## 2021-09-10 DIAGNOSIS — M24.574 CONTRACTURE OF JOINT OF BOTH FEET: ICD-10-CM

## 2021-09-10 DIAGNOSIS — M20.32 HALLUX MALLEUS OF LEFT FOOT: Primary | ICD-10-CM

## 2021-09-10 PROCEDURE — 99203 OFFICE O/P NEW LOW 30 MIN: CPT | Performed by: PODIATRIST

## 2021-09-10 NOTE — PROGRESS NOTES
[facility] New Patient Note    CC: Patient presents with:  Leg or Foot Injury: Patient is here today for left toe possible callus to be looked at.        HPI: The patient is a 61year old male who presents today with complaints of callus that so distally though some blunting of sensation about the toes and the left great toe. 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 Through the epidermal level but no signs of infection.   More ser software.

## 2021-11-04 ENCOUNTER — TELEPHONE (OUTPATIENT)
Dept: FAMILY MEDICINE CLINIC | Facility: CLINIC | Age: 66
End: 2021-11-04

## 2021-11-04 DIAGNOSIS — E11.9 TYPE 2 DIABETES MELLITUS WITHOUT COMPLICATION, WITHOUT LONG-TERM CURRENT USE OF INSULIN (HCC): Primary | ICD-10-CM

## 2021-11-04 NOTE — TELEPHONE ENCOUNTER
Letter and order mailed to patient reminding him he is due for labs.       ZAHRA Cam Nurse  Lipid and A1C

## 2021-12-01 RX ORDER — ATORVASTATIN CALCIUM 10 MG/1
TABLET, FILM COATED ORAL
Qty: 30 TABLET | Refills: 2 | Status: SHIPPED | OUTPATIENT
Start: 2021-12-01

## 2021-12-01 NOTE — TELEPHONE ENCOUNTER
Last refilled 9/2/21 for #30 with 2 RF  LOV with DS 8/16/21  No future appt  Last ALT 8/6/21  Last lipid 4/15/21     Cholesterol Medication Protocol Passed 12/01/2021 03:10 AM   Protocol Details  ALT < 80    ALT resulted within past year    Lipid panel wit

## 2021-12-06 ENCOUNTER — TELEPHONE (OUTPATIENT)
Dept: FAMILY MEDICINE CLINIC | Facility: CLINIC | Age: 66
End: 2021-12-06

## 2021-12-29 ENCOUNTER — TELEPHONE (OUTPATIENT)
Dept: FAMILY MEDICINE CLINIC | Facility: CLINIC | Age: 66
End: 2021-12-29

## 2021-12-29 DIAGNOSIS — E11.9 TYPE 2 DIABETES MELLITUS WITHOUT COMPLICATION, WITHOUT LONG-TERM CURRENT USE OF INSULIN (HCC): Primary | ICD-10-CM

## 2021-12-29 DIAGNOSIS — E78.2 MIXED HYPERLIPIDEMIA: Primary | ICD-10-CM

## 2021-12-29 DIAGNOSIS — K76.0 FATTY LIVER DISEASE, NONALCOHOLIC: ICD-10-CM

## 2021-12-29 DIAGNOSIS — E78.2 MIXED HYPERLIPIDEMIA: ICD-10-CM

## 2021-12-29 RX ORDER — FENOFIBRATE 134 MG/1
134 CAPSULE ORAL DAILY
Qty: 30 CAPSULE | Refills: 5 | Status: SHIPPED | OUTPATIENT
Start: 2021-12-29 | End: 2022-01-28

## 2021-12-29 NOTE — TELEPHONE ENCOUNTER
Pt was advised of results- verbalized understanding    Pt states is is concerned about adding in another medication because he is taking lots of [employer] supplements  And is worried about them causing issues    He states his supplements are for his blood sugars

## 2021-12-29 NOTE — TELEPHONE ENCOUNTER
----- Message from Nila Reynoso DO sent at 12/29/2021  8:09 AM CST -----  Notify his cholesterol looks good and he did a really good job with his A1c, but his TG still need help, I think I am going to add fenofibrate, which is specifically for TG, and the

## 2021-12-29 NOTE — TELEPHONE ENCOUNTER
So clearly the supplements are not working because his TG are still elevated.  At 436  He can;t go off anything , this medication is specifically for TG and does nothing for LIPIDS, so he needs both

## 2022-01-03 NOTE — TELEPHONE ENCOUNTER
Diabetic Medication Protocol Failed 01/02/2022 07:54 AM   Protocol Details  Microalbumin procedure in past 12 months or taking ACE/ARB    HgBA1C procedure resulted in past 6 months    Last HgBA1C < 7.5    Appointment in past 6 or next 3 months     Routing to provider per protocol. Last refilled on 7/11/21 for # 60 with 3 rf. Last labs 12/28/21. Last seen on 8/16/21. No future appointments. Thank you.

## 2022-02-09 PROCEDURE — 3044F HG A1C LEVEL LT 7.0%: CPT | Performed by: FAMILY MEDICINE

## 2022-03-03 RX ORDER — ATORVASTATIN CALCIUM 10 MG/1
TABLET, FILM COATED ORAL
Qty: 30 TABLET | Refills: 2 | Status: SHIPPED | OUTPATIENT
Start: 2022-03-03

## 2022-03-03 NOTE — TELEPHONE ENCOUNTER
Last refilled 12/1/21 for #30 with 2 RF  LOV with DS 8/16/21  No future appt with pcp  Last Lipid 12/28/21  Last ALT 8/6/21    Cholesterol Medication Protocol Passed 03/03/2022 10:13 AM   Protocol Details  ALT < 80    ALT resulted within past year    Lipid panel within past 12 months    Appointment within past 12 or next 3 months

## 2022-03-07 ENCOUNTER — TELEPHONE (OUTPATIENT)
Dept: FAMILY MEDICINE CLINIC | Facility: CLINIC | Age: 67
End: 2022-03-07

## 2022-03-29 ENCOUNTER — OFFICE VISIT (OUTPATIENT)
Dept: FAMILY MEDICINE CLINIC | Facility: CLINIC | Age: 67
End: 2022-03-29
Payer: COMMERCIAL

## 2022-03-29 ENCOUNTER — MED REC SCAN ONLY (OUTPATIENT)
Dept: FAMILY MEDICINE CLINIC | Facility: CLINIC | Age: 67
End: 2022-03-29

## 2022-03-29 VITALS
RESPIRATION RATE: 20 BRPM | HEART RATE: 84 BPM | HEIGHT: 70 IN | DIASTOLIC BLOOD PRESSURE: 60 MMHG | TEMPERATURE: 98 F | WEIGHT: 201 LBS | SYSTOLIC BLOOD PRESSURE: 110 MMHG | BODY MASS INDEX: 28.77 KG/M2

## 2022-03-29 DIAGNOSIS — Z00.00 ENCOUNTER FOR ANNUAL HEALTH EXAMINATION: ICD-10-CM

## 2022-03-29 DIAGNOSIS — I65.23 CAROTID STENOSIS, BILATERAL: ICD-10-CM

## 2022-03-29 DIAGNOSIS — M72.0 DUPUYTREN'S CONTRACTURE OF BOTH HANDS: ICD-10-CM

## 2022-03-29 DIAGNOSIS — Z00.00 MEDICARE ANNUAL WELLNESS VISIT, SUBSEQUENT: Primary | ICD-10-CM

## 2022-03-29 DIAGNOSIS — Z00.00 ENCOUNTER FOR MEDICARE ANNUAL WELLNESS EXAM: ICD-10-CM

## 2022-03-29 DIAGNOSIS — E11.9 TYPE 2 DIABETES MELLITUS WITHOUT COMPLICATION, WITHOUT LONG-TERM CURRENT USE OF INSULIN (HCC): ICD-10-CM

## 2022-03-29 DIAGNOSIS — Z13.6 SCREENING FOR CARDIOVASCULAR CONDITION: ICD-10-CM

## 2022-03-29 DIAGNOSIS — K76.0 FATTY LIVER DISEASE, NONALCOHOLIC: ICD-10-CM

## 2022-03-29 DIAGNOSIS — E78.2 MIXED HYPERLIPIDEMIA: ICD-10-CM

## 2022-03-29 LAB — A1C: 5.9 %

## 2022-03-29 PROCEDURE — 3008F BODY MASS INDEX DOCD: CPT | Performed by: FAMILY MEDICINE

## 2022-03-29 PROCEDURE — 3074F SYST BP LT 130 MM HG: CPT | Performed by: FAMILY MEDICINE

## 2022-03-29 PROCEDURE — 99396 PREV VISIT EST AGE 40-64: CPT | Performed by: FAMILY MEDICINE

## 2022-03-29 PROCEDURE — G0439 PPPS, SUBSEQ VISIT: HCPCS | Performed by: FAMILY MEDICINE

## 2022-03-29 PROCEDURE — 3078F DIAST BP <80 MM HG: CPT | Performed by: FAMILY MEDICINE

## 2022-03-29 PROCEDURE — 96160 PT-FOCUSED HLTH RISK ASSMT: CPT | Performed by: FAMILY MEDICINE

## 2022-04-28 ENCOUNTER — TELEPHONE (OUTPATIENT)
Dept: FAMILY MEDICINE CLINIC | Facility: CLINIC | Age: 67
End: 2022-04-28

## 2022-05-04 ENCOUNTER — TELEPHONE (OUTPATIENT)
Dept: FAMILY MEDICINE CLINIC | Facility: CLINIC | Age: 67
End: 2022-05-04

## 2022-05-04 LAB
ABSOLUTE BASOPHILS: 51 CELLS/UL (ref 0–200)
ABSOLUTE EOSINOPHILS: 168 CELLS/UL (ref 15–500)
ABSOLUTE LYMPHOCYTES: 2548 CELLS/UL (ref 850–3900)
ABSOLUTE MONOCYTES: 613 CELLS/UL (ref 200–950)
ABSOLUTE NEUTROPHILS: 3920 CELLS/UL (ref 1500–7800)
ALBUMIN/GLOBULIN RATIO: 1.6 (CALC) (ref 1–2.5)
ALBUMIN: 4.4 G/DL (ref 3.6–5.1)
ALKALINE PHOSPHATASE: 49 U/L (ref 35–144)
ALT: 23 U/L (ref 9–46)
AST: 19 U/L (ref 10–35)
BASOPHILS: 0.7 %
BILIRUBIN, TOTAL: 0.3 MG/DL (ref 0.2–1.2)
BUN: 18 MG/DL (ref 7–25)
CALCIUM: 10 MG/DL (ref 8.6–10.3)
CARBON DIOXIDE: 28 MMOL/L (ref 20–32)
CHLORIDE: 105 MMOL/L (ref 98–110)
CHOL/HDLC RATIO: 2.8 (CALC)
CHOLESTEROL, TOTAL: 102 MG/DL
CREATININE: 0.96 MG/DL (ref 0.7–1.25)
EGFR IF AFRICN AM: 96 ML/MIN/1.73M2
EGFR IF NONAFRICN AM: 83 ML/MIN/1.73M2
EOSINOPHILS: 2.3 %
GLOBULIN: 2.8 G/DL (CALC) (ref 1.9–3.7)
GLUCOSE: 137 MG/DL (ref 65–99)
HDL CHOLESTEROL: 37 MG/DL
HEMATOCRIT: 43 % (ref 38.5–50)
HEMOGLOBIN: 15 G/DL (ref 13.2–17.1)
LDL-CHOLESTEROL: 40 MG/DL (CALC)
LYMPHOCYTES: 34.9 %
MCH: 31.8 PG (ref 27–33)
MCHC: 34.9 G/DL (ref 32–36)
MCV: 91.3 FL (ref 80–100)
MONOCYTES: 8.4 %
MPV: 11 FL (ref 7.5–12.5)
NEUTROPHILS: 53.7 %
NON-HDL CHOLESTEROL: 65 MG/DL (CALC)
PLATELET COUNT: 269 THOUSAND/UL (ref 140–400)
POTASSIUM: 5.5 MMOL/L (ref 3.5–5.3)
PROTEIN, TOTAL: 7.2 G/DL (ref 6.1–8.1)
RDW: 12.2 % (ref 11–15)
RED BLOOD CELL COUNT: 4.71 MILLION/UL (ref 4.2–5.8)
SODIUM: 140 MMOL/L (ref 135–146)
TRIGLYCERIDES: 167 MG/DL
TSH W/REFLEX TO FT4: 3.29 MIU/L (ref 0.4–4.5)
WHITE BLOOD CELL COUNT: 7.3 THOUSAND/UL (ref 3.8–10.8)

## 2022-05-04 NOTE — TELEPHONE ENCOUNTER
----- Message from Serenity Quintana DO sent at 5/4/2022 11:37 AM CDT -----  Marie Carry Hugoton  labs looked very good, his lipids were excellent, his kidney, liver function, and thyroid were all normal. As was his CBC, PSA still pending?

## 2022-05-05 RX ORDER — ATORVASTATIN CALCIUM 10 MG/1
5 TABLET, FILM COATED ORAL NIGHTLY
Qty: 30 TABLET | Refills: 2 | COMMUNITY
Start: 2022-05-05 | End: 2022-05-09

## 2022-05-05 NOTE — TELEPHONE ENCOUNTER
Pt was advised of recommendation below- verbalized understanding    Future orders placed with recall

## 2022-05-05 NOTE — TELEPHONE ENCOUNTER
Pt was advised of results- verbalized understanding    Pt states he needs refill on Atorvastatin    Pt states he has been watching his diet and he takes a [employer] supplement    He is wondering when we can consider going off some of his medications?

## 2022-05-05 NOTE — TELEPHONE ENCOUNTER
So lets do this, lets cut the atorvastatin in half and decrease the metformin to 500 mg once a day and then recheck labs in 3 months and see where his numbers are.

## 2022-05-09 ENCOUNTER — TELEPHONE (OUTPATIENT)
Dept: FAMILY MEDICINE CLINIC | Facility: CLINIC | Age: 67
End: 2022-05-09

## 2022-05-09 RX ORDER — ATORVASTATIN CALCIUM 10 MG/1
5 TABLET, FILM COATED ORAL NIGHTLY
Qty: 30 TABLET | Refills: 2 | Status: SHIPPED | OUTPATIENT
Start: 2022-05-09

## 2022-05-09 NOTE — TELEPHONE ENCOUNTER
Pt is at pharmacy looking for refills?     Looks like meds were adjusted on 5/5/22- but placed as historical's    RN sent medications to pharmacy per  Written note from Dr. Tru Ramirez on 5/5/22

## 2022-07-25 ENCOUNTER — TELEPHONE (OUTPATIENT)
Dept: FAMILY MEDICINE CLINIC | Facility: CLINIC | Age: 67
End: 2022-07-25

## 2022-07-25 ENCOUNTER — HOSPITAL ENCOUNTER (OUTPATIENT)
Age: 67
Discharge: OTHER INSTITUTION | End: 2022-07-25
Payer: MEDICARE

## 2022-07-25 VITALS
HEART RATE: 78 BPM | BODY MASS INDEX: 26.48 KG/M2 | WEIGHT: 185 LBS | RESPIRATION RATE: 18 BRPM | HEIGHT: 70 IN | OXYGEN SATURATION: 100 % | TEMPERATURE: 98 F | DIASTOLIC BLOOD PRESSURE: 79 MMHG | SYSTOLIC BLOOD PRESSURE: 152 MMHG

## 2022-07-25 DIAGNOSIS — L03.032 CELLULITIS OF TOE OF LEFT FOOT: Primary | ICD-10-CM

## 2022-07-25 LAB — GLUCOSE BLD-MCNC: 270 MG/DL (ref 70–99)

## 2022-07-25 PROCEDURE — 99204 OFFICE O/P NEW MOD 45 MIN: CPT | Performed by: NURSE PRACTITIONER

## 2022-07-25 PROCEDURE — 82962 GLUCOSE BLOOD TEST: CPT | Performed by: NURSE PRACTITIONER

## 2022-07-25 NOTE — ED INITIAL ASSESSMENT (HPI)
Pt stopped taking prescription medications because he was on vacation. Pt had a sore on his left toe for the past few years that he files off. Pt toe swollen and red over the last week.

## 2022-07-25 NOTE — TELEPHONE ENCOUNTER
He needs to at least be seen in [address] [place] [address], maybe ER. This cannot be addressed in the office.

## 2022-07-25 NOTE — TELEPHONE ENCOUNTER
Spoke with patient who states he saw podiatry due to infected toe. Was advised toe needs to be amputated. Patient would like referral for second opinion. Also states his toe infection is flared up. Had not been taking his medications (metformin, atorvastatin or fenofibrate) as he thought it might be making his migraines flare as he had 5 in a row. Started taking them again Friday. [place]. Not sure if that is why infection is flaring up    Toe is swollen and twice as big, red, draining. Had abscess tooth a month ago, did not take all the anitbiotic as it got better so he has been taking leftover clindimycin 300 mg.   .    States GF diagnosed with covid last weekend and he had symtoms last Wednesday. States he is much better now. Still a little weak.     Would like to know if covering provider can send something in for his toe

## 2022-07-25 NOTE — TELEPHONE ENCOUNTER
Pt stated that he was told he needs to have his toe amputated but would like a second opinion from a different podiatrist.    Pt also stated that he believes he needs an antibiotic for an infection in the toe. Pt also stated he believes he has Covid as his girlfriend had it and he has the same symptoms.         [facility] #[id number] - [place] - [address] AT [facility] [address], [phone], [phone]   [address] [address]   Phone: [phone] Fax: [phone]   Hours: Not open 24 hours

## 2022-07-25 NOTE — TELEPHONE ENCOUNTER
PATIENT CALLING STATING HE IS NOW COVID POSITIVE. PLEASE ADVISE FOR NEXT STEP. PATIENT ASKS COULD THIS BE WHY HIS TOE ALL FLARED UP. SHOULD HE STILL GO TO ER OR WIC.

## 2022-08-01 ENCOUNTER — TELEPHONE (OUTPATIENT)
Dept: FAMILY MEDICINE CLINIC | Facility: CLINIC | Age: 67
End: 2022-08-01

## 2022-08-01 NOTE — TELEPHONE ENCOUNTER
LM for pt to call back    Per verbal with Dr. Naveen Beard- pt needs to follow up with surgeon and can follow up with DS in a couple weeks

## 2022-08-01 NOTE — TELEPHONE ENCOUNTER
RN spoke with pt wife- he had partial amputation of the big toe on Friday    Was discharged on the following medications:    Cipro 500mg 1.5 tab BID for 21 days  Augmentin 875-125 BID for 21 days  Hydrocodone 325 1 tab every Q8 #21    See podiatry on Saturday for wound evaulation    They were told to stop taking the fenofibrate     Can he still take his Atorvastatin and Metfromin?

## 2022-08-01 NOTE — TELEPHONE ENCOUNTER
Patient was admitted to [facility] on Monday  He had toe amputation on Friday  Patient was discharged on Saturday    Patient has concerns about medications    Please adv    Thank you

## 2022-09-01 ENCOUNTER — TELEPHONE (OUTPATIENT)
Dept: FAMILY MEDICINE CLINIC | Facility: CLINIC | Age: 67
End: 2022-09-01

## 2022-09-01 DIAGNOSIS — S98.122D: Primary | ICD-10-CM

## 2022-09-01 NOTE — TELEPHONE ENCOUNTER
Routing to provider- will we be supply a referral for this?   Please see below    We have not see patient in regard to this issue- and pt was no show to visit scheduled on 8/23/22 to discuss amputation with DS    Please advise

## 2022-09-01 NOTE — TELEPHONE ENCOUNTER
RN spoke with Emma Neither- and she said pt has been seeing them every week for the past month    RN advised we can place the referral but we can not ensure it will be covered?     Luz advised DX: partial amputation of L great toe      Fax # [phone]

## 2022-09-01 NOTE — TELEPHONE ENCOUNTER
RN LM for office to call back    Need to disucss what referral is for? Office visit or therapy administered in office?

## 2022-09-01 NOTE — TELEPHONE ENCOUNTER
DR Macrina Hunter    [phone]  IYK:[phone]    MARJORIE CALLING FROM DR Laurence Conte (PODIATRIST) OFFICE. PATIENT HAS BEEN SEEN AT THAT OFFICE 3 TIMES ALREADY AND HAS AN APPOINTMENT THIS Saturday. PATIENT DIDN'T BRING A REFERRAL.

## 2022-09-07 ENCOUNTER — OFFICE VISIT (OUTPATIENT)
Dept: FAMILY MEDICINE CLINIC | Facility: CLINIC | Age: 67
End: 2022-09-07
Payer: COMMERCIAL

## 2022-09-07 VITALS
BODY MASS INDEX: 28 KG/M2 | WEIGHT: 194.38 LBS | SYSTOLIC BLOOD PRESSURE: 140 MMHG | OXYGEN SATURATION: 98 % | DIASTOLIC BLOOD PRESSURE: 84 MMHG | TEMPERATURE: 98 F | HEART RATE: 98 BPM

## 2022-09-07 DIAGNOSIS — L97.521 ULCER OF LEFT FOOT, LIMITED TO BREAKDOWN OF SKIN (HCC): ICD-10-CM

## 2022-09-07 DIAGNOSIS — E11.9 TYPE 2 DIABETES MELLITUS WITHOUT COMPLICATION, WITHOUT LONG-TERM CURRENT USE OF INSULIN (HCC): Primary | ICD-10-CM

## 2022-09-07 PROBLEM — M86.9 OSTEOMYELITIS OF GREAT TOE OF LEFT FOOT (HCC): Status: ACTIVE | Noted: 2022-07-25

## 2022-09-07 PROBLEM — E11.51 DIABETES MELLITUS TYPE 2 WITH PERIPHERAL ARTERY DISEASE (HCC): Chronic | Status: ACTIVE | Noted: 2022-09-07

## 2022-09-07 PROCEDURE — 99214 OFFICE O/P EST MOD 30 MIN: CPT | Performed by: FAMILY MEDICINE

## 2022-09-07 PROCEDURE — 3077F SYST BP >= 140 MM HG: CPT | Performed by: FAMILY MEDICINE

## 2022-09-07 PROCEDURE — 3079F DIAST BP 80-89 MM HG: CPT | Performed by: FAMILY MEDICINE

## 2022-09-17 ENCOUNTER — TELEPHONE (OUTPATIENT)
Dept: FAMILY MEDICINE CLINIC | Facility: CLINIC | Age: 67
End: 2022-09-17

## 2022-09-17 NOTE — TELEPHONE ENCOUNTER
RN spoke to pt and advised that the referral from 9/1/22 was denied- that podiatrist is not in network    Pt states he was given this provider saw him in the ER- and this provider did the surgery when he was in the hosptial and was advised for follow up care. This provider was the podiatrist on [address] at [facility]. Routing to provider- do you want to try to do a PTP to get this approved?     PT aware DS is out of office until Monday and referrals is not available until Monday as well

## 2022-09-17 NOTE — TELEPHONE ENCOUNTER
PATIENT IS AT DR Jn Cabrera (PODIATRIST) OFFICE. PATIENT ASKING IF DR HOOK CAN ADD MAYBE 5 VISITS ON A REFERRAL. PATIENT IS SEEING PODIATRIST ONCE A WEEK  UNTIL HIS TOE HEALS.

## 2022-09-19 NOTE — TELEPHONE ENCOUNTER
RN sent message to referrals department to see if we can get this revised since this provider is who pt saw inpatient and did his amputation    Pt has some continued issues with healing and needs to follow up with this provider

## 2022-09-20 ENCOUNTER — TELEPHONE (OUTPATIENT)
Dept: FAMILY MEDICINE CLINIC | Facility: CLINIC | Age: 67
End: 2022-09-20

## 2022-09-20 NOTE — TELEPHONE ENCOUNTER
Reached patient for medication adherence consult. Per insurance report, patient is past due for refill on metformin and atorvastatin. Spent >25 minutes on the phone with patient today discussing medications. Patient tells me he was hospitalized for a few weeks for a toe amputation so was using hospital medications. He is getting low on atorvastatin and is requesting this medication be refilled. He does confirm that he is taking half tablet atorvastatin daily. He does still have plenty of metformin remaining as this dose was reduced so he is still working through the supply he has. Patient tells me he does not like to take a lot of medications and does admit to sometimes forgetting to take them. Did provide education and stressed importance of taking medication just like prescribed to get the most benefit. Discussed diet and lifestyle changes that can help improve blood sugar and cholesterol numbers as well. Did recommend that he continue to take his medication as prescribed by PCP until labs rechecked, then PCP can re-evaluate medications with him at that time. Patient confirmed understanding and denies other questions at this time.

## 2022-10-27 ENCOUNTER — TELEPHONE (OUTPATIENT)
Dept: FAMILY MEDICINE CLINIC | Facility: CLINIC | Age: 67
End: 2022-10-27

## 2022-10-27 NOTE — TELEPHONE ENCOUNTER
Dr. Vicki Daniel office called they are needing to have a referral place for 3 procedures that patient is having done. CPT code are  91 21 06  [id number]        If any questions please call [phone]      Once refrrals are approved please fax to [phone].  Thank you

## 2022-10-27 NOTE — TELEPHONE ENCOUNTER
RN to work on referral.  Rn is waiting to hear back from referrals department if this provider is in network for pt- earlier referrals were denied?

## 2022-12-15 ENCOUNTER — TELEPHONE (OUTPATIENT)
Dept: FAMILY MEDICINE CLINIC | Facility: CLINIC | Age: 67
End: 2022-12-15

## 2022-12-15 DIAGNOSIS — M86.9 OSTEOMYELITIS OF GREAT TOE OF LEFT FOOT (HCC): Primary | ICD-10-CM

## 2022-12-15 DIAGNOSIS — E11.9 TYPE 2 DIABETES MELLITUS WITHOUT COMPLICATION, WITHOUT LONG-TERM CURRENT USE OF INSULIN (HCC): ICD-10-CM

## 2022-12-15 NOTE — TELEPHONE ENCOUNTER
Spoke with patient, advised note below. Chelle Carpio is a PA at Dr. Leila Chapa office. Patient is able to see anyone in that office.  Patient v/u

## 2022-12-15 NOTE — TELEPHONE ENCOUNTER
Pt needs a referral to Merlene Chavez, [facility], infections disease expert for toe . Please fax to:  [phone]    Pt grant appt 12/19 at 1:00. Thank you!

## 2022-12-16 ENCOUNTER — MED REC SCAN ONLY (OUTPATIENT)
Dept: FAMILY MEDICINE CLINIC | Facility: CLINIC | Age: 67
End: 2022-12-16

## 2022-12-20 ENCOUNTER — TELEPHONE (OUTPATIENT)
Dept: FAMILY MEDICINE CLINIC | Facility: CLINIC | Age: 67
End: 2022-12-20

## 2022-12-28 ENCOUNTER — OFFICE VISIT (OUTPATIENT)
Dept: FAMILY MEDICINE CLINIC | Facility: CLINIC | Age: 67
End: 2022-12-28
Payer: COMMERCIAL

## 2022-12-28 VITALS
WEIGHT: 204.25 LBS | BODY MASS INDEX: 29 KG/M2 | TEMPERATURE: 98 F | HEART RATE: 92 BPM | RESPIRATION RATE: 18 BRPM | OXYGEN SATURATION: 96 % | DIASTOLIC BLOOD PRESSURE: 60 MMHG | SYSTOLIC BLOOD PRESSURE: 110 MMHG

## 2022-12-28 DIAGNOSIS — L03.031 CELLULITIS OF RIGHT TOE: ICD-10-CM

## 2022-12-28 DIAGNOSIS — E11.51 DIABETES MELLITUS TYPE 2 WITH PERIPHERAL ARTERY DISEASE (HCC): Primary | ICD-10-CM

## 2022-12-28 DIAGNOSIS — M86.9 OSTEOMYELITIS OF GREAT TOE OF RIGHT FOOT (HCC): ICD-10-CM

## 2022-12-28 PROCEDURE — 3078F DIAST BP <80 MM HG: CPT | Performed by: FAMILY MEDICINE

## 2022-12-28 PROCEDURE — 99213 OFFICE O/P EST LOW 20 MIN: CPT | Performed by: FAMILY MEDICINE

## 2022-12-28 PROCEDURE — 3074F SYST BP LT 130 MM HG: CPT | Performed by: FAMILY MEDICINE

## 2022-12-28 RX ORDER — DIAZEPAM 5 MG/1
5 TABLET ORAL AS NEEDED
COMMUNITY
Start: 2022-11-10

## 2022-12-28 RX ORDER — AMMONIUM LACTATE 12 G/100G
LOTION TOPICAL
COMMUNITY
Start: 2022-08-30

## 2022-12-28 RX ORDER — DIAZEPAM 5 MG/1
2.5 TABLET ORAL EVERY 6 HOURS PRN
Qty: 30 TABLET | Refills: 0 | Status: SHIPPED | OUTPATIENT
Start: 2022-12-28 | End: 2023-01-27

## 2022-12-28 RX ORDER — HYDROCODONE BITARTRATE AND ACETAMINOPHEN 10; 325 MG/1; MG/1
1 TABLET ORAL EVERY 8 HOURS PRN
COMMUNITY
Start: 2022-12-12

## 2022-12-28 RX ORDER — ATORVASTATIN CALCIUM 10 MG/1
5 TABLET, FILM COATED ORAL NIGHTLY
Qty: 30 TABLET | Refills: 3 | Status: SHIPPED | OUTPATIENT
Start: 2022-12-28

## 2022-12-28 RX ORDER — SULFAMETHOXAZOLE AND TRIMETHOPRIM 800; 160 MG/1; MG/1
TABLET ORAL
COMMUNITY
Start: 2022-12-19

## 2022-12-28 RX ORDER — BUTALBITAL, ACETAMINOPHEN AND CAFFEINE 50; 325; 40 MG/1; MG/1; MG/1
1 TABLET ORAL EVERY 4 HOURS PRN
COMMUNITY
Start: 2022-12-20

## 2022-12-28 RX ORDER — ASPIRIN 81 MG/1
81 TABLET ORAL DAILY
COMMUNITY

## 2023-01-11 ENCOUNTER — TELEPHONE (OUTPATIENT)
Dept: FAMILY MEDICINE CLINIC | Facility: CLINIC | Age: 68
End: 2023-01-11

## 2023-01-11 DIAGNOSIS — E11.51 DIABETES MELLITUS TYPE 2 WITH PERIPHERAL ARTERY DISEASE (HCC): Primary | ICD-10-CM

## 2023-01-11 DIAGNOSIS — E78.2 MIXED HYPERLIPIDEMIA: ICD-10-CM

## 2023-01-11 LAB
CHOL/HDLC RATIO: 4.1 (CALC)
CHOLESTEROL, TOTAL: 149 MG/DL
HDL CHOLESTEROL: 36 MG/DL
HEMOGLOBIN A1C: 6.7 % OF TOTAL HGB
LDL-CHOLESTEROL: 65 MG/DL (CALC)
NON-HDL CHOLESTEROL: 113 MG/DL (CALC)
TRIGLYCERIDES: 399 MG/DL

## 2023-01-11 NOTE — TELEPHONE ENCOUNTER
----- Message from Daija Fraire DO sent at 1/11/2023  7:50 AM CST -----  Please notify Ladi Yash his cholesterol looks great, and his BS control is also very good, no changes at this time, meds stay the same and recheck in 6 months.

## 2023-02-01 ENCOUNTER — TELEPHONE (OUTPATIENT)
Dept: FAMILY MEDICINE CLINIC | Facility: CLINIC | Age: 68
End: 2023-02-01

## 2023-02-01 DIAGNOSIS — M86.9 OSTEOMYELITIS OF GREAT TOE OF LEFT FOOT (HCC): Primary | ICD-10-CM

## 2023-02-01 NOTE — TELEPHONE ENCOUNTER
PATIENT WAS TOLD TO SEE HIS PCP FOR  THERAPEUTIC SHOES ORDER. PATIENT WAS BEING FITTED FOR A SHOE INSERT TODAY. HE SAYS HE THOUGHT THE INSERT WAS TRANSFERABLE BUT IS NOT. PATIENT DROPPED OFF PAPERWORK. PATIENT ASKING ALSO IF DR HOOK THINKS TRANSFERABLE IS OK VS THE ONE FIXED SHOE WITH INSERT. PATIENT DOES PREFER THE TRANSFERABLE ONE BUT WILL LISTEN TO DR HOOK PREFERENCE FOR HIM.

## 2023-02-02 NOTE — TELEPHONE ENCOUNTER
RN Placed referral for DME for [address] [place] form over as well    Not sure if he will need to to get clinical notes from his podiatrist that did his amputation and their notes as we have not been managing that care.

## 2023-02-02 NOTE — TELEPHONE ENCOUNTER
I thought he was seeing a podiatrist? If they ordered it, they should submit it?  And if the podiatrist recommended  Fixed it should be as such

## 2023-02-04 NOTE — TELEPHONE ENCOUNTER
Pt called to clafify what kind of shoe or insert he will be needing    Pt states that he was under the impression the podiatrist wanted a full diabetic shoe- but pt states he only needs a insert since it is just affecting a couple areas on his foot    RN advised we placed the order for transferable insert and it was faxed to bhavesh.   Pt was advised he needs to contact insurance to see if they ro be covering the insert the same as the diabetic shoe    Verbalized understanding

## 2023-02-06 ENCOUNTER — TELEPHONE (OUTPATIENT)
Dept: FAMILY MEDICINE CLINIC | Facility: CLINIC | Age: 68
End: 2023-02-06

## 2023-02-06 NOTE — TELEPHONE ENCOUNTER
Uri Abdi from [facility] called they did receive diabetic statement but the date list was 12/28/2023 said year should be 2022. Also asked if we could please send office notes from visit 12/28/2022.     Phone # [phone]  Fax # [phone]

## 2023-02-08 ENCOUNTER — TELEPHONE (OUTPATIENT)
Dept: FAMILY MEDICINE CLINIC | Facility: CLINIC | Age: 68
End: 2023-02-08

## 2023-02-08 RX ORDER — ATORVASTATIN CALCIUM 10 MG/1
10 TABLET, FILM COATED ORAL NIGHTLY
Qty: 30 TABLET | Refills: 3 | Status: SHIPPED | OUTPATIENT
Start: 2023-02-08

## 2023-02-08 NOTE — TELEPHONE ENCOUNTER
Pt states he was on 0.5 tab of atorvastatin and he said the last time Dr. Treovr Escobar upped him to a full tab daily.     So he states he has been taking 1 pill daily    Rn spoke cherelle Escobar- per verbal conversation pt should be on 1 full tabe 10 mg atorvastatin nightly

## 2023-02-08 NOTE — TELEPHONE ENCOUNTER
[facility] #[id number] - [place] - [address] AT [facility] [address], [phone], [phone]   [address] [address]   Phone: [phone] Fax: [phone]   Hours: Not open 24 hours     PATIENT CALLING. [facility] WON'T REFILL BECAUSE IT IS TOO SOON FOR THE ATORVASTATIN. PATIENT ATORVASTATIN HAS BEEN INCREASED AND PATIENT IS OUT.

## 2023-02-10 ENCOUNTER — TELEPHONE (OUTPATIENT)
Dept: FAMILY MEDICINE CLINIC | Facility: CLINIC | Age: 68
End: 2023-02-10

## 2023-02-10 NOTE — TELEPHONE ENCOUNTER
Called patient, he was not at home to give me the dosage for his Fioricet. Pt will call back with that information.

## 2023-02-10 NOTE — TELEPHONE ENCOUNTER
[facility] #[id number] - [place] - [address] AT [facility] [address], [phone], [phone]   [address] [address]   Phone: [phone] Fax: [phone]   Hours: Not open 24 hours     PATIENT IS ASKING FOR RX ON BUTALBITAL-ACETAMINOPHEN.  HE SAYS A PROVIDER THAT PRESCRIBED THAT IN PAST TOLD PATIENT TO [facility] HIS PCP

## 2023-03-03 ENCOUNTER — TELEPHONE (OUTPATIENT)
Dept: FAMILY MEDICINE CLINIC | Facility: CLINIC | Age: 68
End: 2023-03-03

## 2023-03-03 NOTE — TELEPHONE ENCOUNTER
Pt called he just went to Dr. Dixie Robertson - Dentist and was told he is needing a crown and tooth pulled. He said they are going to be faxing us a request for medication list and recent labs. Pt also wants to know if he is needing to have any labs done prior to seeing Dr. Lesvia Thibodeaux on Tuesday?       Pt advised dr is out of office

## 2023-03-04 NOTE — TELEPHONE ENCOUNTER
There is nothing pressing that can;t wait until Tuesday, he needs a CMP, TSH, PSA and Urine microalb

## 2023-03-07 ENCOUNTER — OFFICE VISIT (OUTPATIENT)
Dept: FAMILY MEDICINE CLINIC | Facility: CLINIC | Age: 68
End: 2023-03-07
Payer: MEDICARE

## 2023-03-07 VITALS
SYSTOLIC BLOOD PRESSURE: 126 MMHG | DIASTOLIC BLOOD PRESSURE: 78 MMHG | WEIGHT: 200.5 LBS | TEMPERATURE: 97 F | BODY MASS INDEX: 29.7 KG/M2 | OXYGEN SATURATION: 95 % | HEIGHT: 69 IN | HEART RATE: 73 BPM | RESPIRATION RATE: 18 BRPM

## 2023-03-07 DIAGNOSIS — E11.9 DIABETES MELLITUS TYPE 2, NONINSULIN DEPENDENT (HCC): ICD-10-CM

## 2023-03-07 DIAGNOSIS — M72.0 DUPUYTREN'S CONTRACTURE OF BOTH HANDS: ICD-10-CM

## 2023-03-07 DIAGNOSIS — Z23 NEED FOR VACCINATION: ICD-10-CM

## 2023-03-07 DIAGNOSIS — Z00.00 ENCOUNTER FOR ANNUAL HEALTH EXAMINATION: ICD-10-CM

## 2023-03-07 DIAGNOSIS — Z00.00 ENCOUNTER FOR MEDICARE ANNUAL WELLNESS EXAM: ICD-10-CM

## 2023-03-07 DIAGNOSIS — E78.2 MIXED HYPERLIPIDEMIA: ICD-10-CM

## 2023-03-07 DIAGNOSIS — K76.0 FATTY LIVER DISEASE, NONALCOHOLIC: ICD-10-CM

## 2023-03-07 DIAGNOSIS — E11.51 DIABETES MELLITUS TYPE 2 WITH PERIPHERAL ARTERY DISEASE (HCC): Chronic | ICD-10-CM

## 2023-03-07 DIAGNOSIS — Z00.00 MEDICARE ANNUAL WELLNESS VISIT, SUBSEQUENT: Primary | ICD-10-CM

## 2023-03-07 DIAGNOSIS — M86.9 OSTEOMYELITIS OF GREAT TOE OF LEFT FOOT (HCC): ICD-10-CM

## 2023-03-07 DIAGNOSIS — Z13.6 SCREENING FOR CARDIOVASCULAR CONDITION: ICD-10-CM

## 2023-03-07 DIAGNOSIS — Z12.5 PROSTATE CANCER SCREENING: ICD-10-CM

## 2023-03-07 DIAGNOSIS — I65.23 CAROTID STENOSIS, BILATERAL: ICD-10-CM

## 2023-03-07 LAB
ALBUMIN SERPL-MCNC: 4.3 G/DL (ref 3.4–5)
ALBUMIN/GLOB SERPL: 1.1 {RATIO} (ref 1–2)
ALP LIVER SERPL-CCNC: 90 U/L
ALT SERPL-CCNC: 43 U/L
ANION GAP SERPL CALC-SCNC: 2 MMOL/L (ref 0–18)
AST SERPL-CCNC: 23 U/L (ref 15–37)
BASOPHILS # BLD AUTO: 0.07 X10(3) UL (ref 0–0.2)
BASOPHILS NFR BLD AUTO: 1 %
BILIRUB SERPL-MCNC: 0.3 MG/DL (ref 0.1–2)
BUN BLD-MCNC: 15 MG/DL (ref 7–18)
CALCIUM BLD-MCNC: 10.2 MG/DL (ref 8.5–10.1)
CHLORIDE SERPL-SCNC: 105 MMOL/L (ref 98–112)
CHOLEST SERPL-MCNC: 134 MG/DL (ref ?–200)
CO2 SERPL-SCNC: 28 MMOL/L (ref 21–32)
COMPLEXED PSA SERPL-MCNC: 0.54 NG/ML (ref ?–4)
CREAT BLD-MCNC: 0.96 MG/DL
CREAT UR-SCNC: 137 MG/DL
EOSINOPHIL # BLD AUTO: 0.14 X10(3) UL (ref 0–0.7)
EOSINOPHIL NFR BLD AUTO: 2 %
ERYTHROCYTE [DISTWIDTH] IN BLOOD BY AUTOMATED COUNT: 13 %
EST. AVERAGE GLUCOSE BLD GHB EST-MCNC: 151 MG/DL (ref 68–126)
FASTING PATIENT LIPID ANSWER: YES
FASTING STATUS PATIENT QL REPORTED: YES
GFR SERPLBLD BASED ON 1.73 SQ M-ARVRAT: 88 ML/MIN/1.73M2 (ref 60–?)
GLOBULIN PLAS-MCNC: 4 G/DL (ref 2.8–4.4)
GLUCOSE BLD-MCNC: 168 MG/DL (ref 70–99)
HBA1C MFR BLD: 6.9 % (ref ?–5.7)
HCT VFR BLD AUTO: 45.5 %
HDLC SERPL-MCNC: 33 MG/DL (ref 40–59)
HGB BLD-MCNC: 15.7 G/DL
IMM GRANULOCYTES # BLD AUTO: 0.07 X10(3) UL (ref 0–1)
IMM GRANULOCYTES NFR BLD: 1 %
LDLC SERPL CALC-MCNC: 43 MG/DL (ref ?–100)
LYMPHOCYTES # BLD AUTO: 2.43 X10(3) UL (ref 1–4)
LYMPHOCYTES NFR BLD AUTO: 35.5 %
MCH RBC QN AUTO: 32.4 PG (ref 26–34)
MCHC RBC AUTO-ENTMCNC: 34.5 G/DL (ref 31–37)
MCV RBC AUTO: 93.8 FL
MICROALBUMIN UR-MCNC: 0.88 MG/DL
MICROALBUMIN/CREAT 24H UR-RTO: 6.4 UG/MG (ref ?–30)
MONOCYTES # BLD AUTO: 0.5 X10(3) UL (ref 0.1–1)
MONOCYTES NFR BLD AUTO: 7.3 %
NEUTROPHILS # BLD AUTO: 3.64 X10 (3) UL (ref 1.5–7.7)
NEUTROPHILS # BLD AUTO: 3.64 X10(3) UL (ref 1.5–7.7)
NEUTROPHILS NFR BLD AUTO: 53.2 %
NONHDLC SERPL-MCNC: 101 MG/DL (ref ?–130)
OSMOLALITY SERPL CALC.SUM OF ELEC: 285 MOSM/KG (ref 275–295)
PLATELET # BLD AUTO: 232 10(3)UL (ref 150–450)
POTASSIUM SERPL-SCNC: 5.5 MMOL/L (ref 3.5–5.1)
PROT SERPL-MCNC: 8.3 G/DL (ref 6.4–8.2)
RBC # BLD AUTO: 4.85 X10(6)UL
SODIUM SERPL-SCNC: 135 MMOL/L (ref 136–145)
TRIGL SERPL-MCNC: 395 MG/DL (ref 30–149)
TSI SER-ACNC: 1.69 MIU/ML (ref 0.36–3.74)
VLDLC SERPL CALC-MCNC: 55 MG/DL (ref 0–30)
WBC # BLD AUTO: 6.9 X10(3) UL (ref 4–11)

## 2023-03-07 PROCEDURE — 84443 ASSAY THYROID STIM HORMONE: CPT | Performed by: FAMILY MEDICINE

## 2023-03-07 PROCEDURE — 82570 ASSAY OF URINE CREATININE: CPT | Performed by: FAMILY MEDICINE

## 2023-03-07 PROCEDURE — 82043 UR ALBUMIN QUANTITATIVE: CPT | Performed by: FAMILY MEDICINE

## 2023-03-07 PROCEDURE — 80061 LIPID PANEL: CPT | Performed by: FAMILY MEDICINE

## 2023-03-07 PROCEDURE — 83036 HEMOGLOBIN GLYCOSYLATED A1C: CPT | Performed by: FAMILY MEDICINE

## 2023-03-07 PROCEDURE — 85025 COMPLETE CBC W/AUTO DIFF WBC: CPT | Performed by: FAMILY MEDICINE

## 2023-03-07 PROCEDURE — 80053 COMPREHEN METABOLIC PANEL: CPT | Performed by: FAMILY MEDICINE

## 2023-03-07 PROCEDURE — G0439 PPPS, SUBSEQ VISIT: HCPCS | Performed by: FAMILY MEDICINE

## 2023-03-07 RX ORDER — LISINOPRIL 2.5 MG/1
2.5 TABLET ORAL DAILY
Qty: 30 TABLET | Refills: 3 | Status: SHIPPED | OUTPATIENT
Start: 2023-03-07 | End: 2023-04-06

## 2023-03-07 RX ORDER — DIAZEPAM 5 MG/1
2.5 TABLET ORAL EVERY 6 HOURS PRN
Qty: 30 TABLET | Refills: 0 | Status: SHIPPED | OUTPATIENT
Start: 2023-03-07 | End: 2023-04-06

## 2023-03-08 ENCOUNTER — TELEPHONE (OUTPATIENT)
Dept: FAMILY MEDICINE CLINIC | Facility: CLINIC | Age: 68
End: 2023-03-08

## 2023-03-08 DIAGNOSIS — E11.9 DIABETES MELLITUS TYPE 2, NONINSULIN DEPENDENT (HCC): Primary | ICD-10-CM

## 2023-03-08 NOTE — TELEPHONE ENCOUNTER
----- Message from MIG China, DO sent at 3/8/2023  8:15 AM CST -----  Please notify Donel Hearing that his cholesterol is not bad but needs to be better, the atorvastatin I sent in should help with that, especially his TG, his BS control is going up, went from 6.7 to 6.9 which is about a 20-30 point jump, otherwise his prostate blood count, kidney function. , liver function tests all look good as well.  I know he's not been able to exercise much due to his foot surgery, maybe as he gets better he can increase that and we can recheck the A1c in 3-4 months

## 2023-03-09 ENCOUNTER — TELEPHONE (OUTPATIENT)
Dept: FAMILY MEDICINE CLINIC | Facility: CLINIC | Age: 68
End: 2023-03-09

## 2023-03-09 NOTE — TELEPHONE ENCOUNTER
We received note from Dentist for clearance for treatment    Dr. Oziel Pedersen we need to get infectious disease on board for the ABX management    LM for pt to call back    Form is at RN station- once we know who his ID is- we can fax the form there

## 2023-03-14 ENCOUNTER — TELEPHONE (OUTPATIENT)
Dept: FAMILY MEDICINE CLINIC | Facility: CLINIC | Age: 68
End: 2023-03-14

## 2023-03-14 DIAGNOSIS — E11.9 DIABETES MELLITUS TYPE 2, NONINSULIN DEPENDENT (HCC): Primary | ICD-10-CM

## 2023-03-14 LAB
ABSOLUTE BASOPHILS: 49 CELLS/UL (ref 0–200)
ABSOLUTE EOSINOPHILS: 189 CELLS/UL (ref 15–500)
ABSOLUTE LYMPHOCYTES: 2415 CELLS/UL (ref 850–3900)
ABSOLUTE MONOCYTES: 588 CELLS/UL (ref 200–950)
ABSOLUTE NEUTROPHILS: 3759 CELLS/UL (ref 1500–7800)
ALBUMIN/GLOBULIN RATIO: 1.8 (CALC) (ref 1–2.5)
ALBUMIN: 4.6 G/DL (ref 3.6–5.1)
ALKALINE PHOSPHATASE: 86 U/L (ref 35–144)
ALT: 26 U/L (ref 9–46)
AST: 20 U/L (ref 10–35)
BASOPHILS: 0.7 %
BILIRUBIN, TOTAL: 0.3 MG/DL (ref 0.2–1.2)
BUN: 15 MG/DL (ref 7–25)
CALCIUM: 9.6 MG/DL (ref 8.6–10.3)
CARBON DIOXIDE: 28 MMOL/L (ref 20–32)
CHLORIDE: 103 MMOL/L (ref 98–110)
CREATININE: 0.89 MG/DL (ref 0.7–1.35)
EGFR: 95 ML/MIN/1.73M2
EOSINOPHILS: 2.7 %
GLOBULIN: 2.6 G/DL (CALC) (ref 1.9–3.7)
GLUCOSE: 155 MG/DL (ref 65–99)
HEMATOCRIT: 43.5 % (ref 38.5–50)
HEMOGLOBIN: 15.2 G/DL (ref 13.2–17.1)
LYMPHOCYTES: 34.5 %
MCH: 32.2 PG (ref 27–33)
MCHC: 34.9 G/DL (ref 32–36)
MCV: 92.2 FL (ref 80–100)
MONOCYTES: 8.4 %
MPV: 10.6 FL (ref 7.5–12.5)
NEUTROPHILS: 53.7 %
PLATELET COUNT: 252 THOUSAND/UL (ref 140–400)
POTASSIUM: 4.9 MMOL/L (ref 3.5–5.3)
PROTEIN, TOTAL: 7.2 G/DL (ref 6.1–8.1)
RDW: 12.6 % (ref 11–15)
RED BLOOD CELL COUNT: 4.72 MILLION/UL (ref 4.2–5.8)
SODIUM: 139 MMOL/L (ref 135–146)
TSH W/REFLEX TO FT4: 4.3 MIU/L (ref 0.4–4.5)
WHITE BLOOD CELL COUNT: 7 THOUSAND/UL (ref 3.8–10.8)

## 2023-03-14 NOTE — TELEPHONE ENCOUNTER
----- Message from Michell Jeronimo DO sent at 3/14/2023  2:36 PM CDT -----  Can notify that his kidney , liver function , thyroid blood count and PSA were normal. He's good for 6 months on labs no changes at this time

## 2023-03-15 ENCOUNTER — TELEPHONE (OUTPATIENT)
Dept: FAMILY MEDICINE CLINIC | Facility: CLINIC | Age: 68
End: 2023-03-15

## 2023-03-15 RX ORDER — AMOXICILLIN 500 MG/1
TABLET, FILM COATED ORAL
Qty: 4 TABLET | Refills: 0 | Status: SHIPPED | OUTPATIENT
Start: 2023-03-15

## 2023-03-15 NOTE — TELEPHONE ENCOUNTER
Patient is upset and confused by his antibiotic orders    He states he was on antibiotics for toe    The pharmacy now has antibiotics from dr Kori De La O and dr Jose L Davis    He is not sure what he is supposed to take and when    Please adv  Thank you

## 2023-03-15 NOTE — TELEPHONE ENCOUNTER
His ID Dr. Dr. Fernandez April well, wnts him on amoxicillin for 1 day prior to surgery and then 6 days after, already called in the RX, he toledo not have to take what I called In also was told to stop the Bactrim at this point.  Needs Jena to send the Dentist a letter stating treatment course

## 2023-03-15 NOTE — TELEPHONE ENCOUNTER
Dentist wondering status of paperwork. Please call:  Madyson Mas at [address]  [phone]   Thank you!

## 2023-03-16 NOTE — TELEPHONE ENCOUNTER
Rn Spoke to surgeon at [facility] - they wanted to confirm that pt is to be taking the medication prescribed by ID    Per the notes below RN did advise that we are defering to ID to manage this    Verbalized understanding

## 2023-04-12 RX ORDER — DIAZEPAM 5 MG/1
2.5 TABLET ORAL EVERY 6 HOURS PRN
Qty: 30 TABLET | Refills: 0 | Status: SHIPPED | OUTPATIENT
Start: 2023-04-12 | End: 2023-05-12

## 2023-04-12 RX ORDER — HYDROCODONE BITARTRATE AND ACETAMINOPHEN 10; 325 MG/1; MG/1
1 TABLET ORAL EVERY 8 HOURS PRN
Qty: 30 TABLET | Refills: 0 | Status: SHIPPED | OUTPATIENT
Start: 2023-04-12 | End: 2023-05-12

## 2023-04-12 NOTE — TELEPHONE ENCOUNTER
[facility] #[id number] - [place] - [address] AT [facility] [address], [phone], [phone]   [address] [address]   Phone: [phone] Fax: [phone]   Hours: Not open 24 hours     PATIENT SAYS HE WENT FOR A LONG WALK YESTERDAY. THE AREA WHERE THE TOE WAS AMPUTATED IS REALLY SORE.  PATIENT ASKING FOR REFILL ON HIS HYDROCODONE AND DIAZEPAM 5MG

## 2023-04-12 NOTE — TELEPHONE ENCOUNTER
LOV: 3/7/23   Last Refill:   Diazepam 5mg 3/7/23 30 0 R  Hydrocodone 12/22/22     No future appointments.

## 2023-04-28 RX ORDER — ATORVASTATIN CALCIUM 10 MG/1
TABLET, FILM COATED ORAL
Qty: 90 TABLET | Refills: 1 | Status: SHIPPED | OUTPATIENT
Start: 2023-04-28

## 2023-05-31 ENCOUNTER — OFFICE VISIT (OUTPATIENT)
Dept: FAMILY MEDICINE CLINIC | Facility: CLINIC | Age: 68
End: 2023-05-31
Payer: MEDICARE

## 2023-05-31 VITALS
BODY MASS INDEX: 29 KG/M2 | WEIGHT: 195 LBS | TEMPERATURE: 98 F | OXYGEN SATURATION: 97 % | RESPIRATION RATE: 18 BRPM | SYSTOLIC BLOOD PRESSURE: 124 MMHG | DIASTOLIC BLOOD PRESSURE: 64 MMHG | HEART RATE: 106 BPM

## 2023-05-31 DIAGNOSIS — S98.122D: Primary | ICD-10-CM

## 2023-05-31 DIAGNOSIS — L03.031 CELLULITIS OF GREAT TOE OF RIGHT FOOT: ICD-10-CM

## 2023-05-31 PROCEDURE — 99214 OFFICE O/P EST MOD 30 MIN: CPT | Performed by: FAMILY MEDICINE

## 2023-06-01 DIAGNOSIS — E11.51 DIABETES MELLITUS TYPE 2 WITH PERIPHERAL ARTERY DISEASE (HCC): Primary | ICD-10-CM

## 2023-06-01 DIAGNOSIS — E11.9 DIABETES MELLITUS TYPE 2, NONINSULIN DEPENDENT (HCC): ICD-10-CM

## 2023-06-01 RX ORDER — DIAZEPAM 5 MG/1
2.5 TABLET ORAL EVERY 6 HOURS PRN
Qty: 30 TABLET | Refills: 0 | Status: SHIPPED | OUTPATIENT
Start: 2023-06-01 | End: 2023-07-01

## 2023-06-01 RX ORDER — HYDROCODONE BITARTRATE AND ACETAMINOPHEN 10; 325 MG/1; MG/1
1 TABLET ORAL EVERY 8 HOURS PRN
Qty: 30 TABLET | Refills: 0 | Status: SHIPPED | OUTPATIENT
Start: 2023-06-01 | End: 2023-07-01

## 2023-06-01 RX ORDER — BUTALBITAL, ACETAMINOPHEN AND CAFFEINE 50; 325; 40 MG/1; MG/1; MG/1
1 TABLET ORAL EVERY 4 HOURS PRN
Qty: 30 TABLET | Refills: 1 | Status: SHIPPED | OUTPATIENT
Start: 2023-06-01 | End: 2023-07-01

## 2023-06-01 NOTE — TELEPHONE ENCOUNTER
FORGOT TO MENTION THAT PATIENT WAS TOLD BY PHARMACIST THAT MEDICARE DOES COVER THE GLUCOSE MACHINE. PATIENT REQUESTING THIS ALSO.

## 2023-06-01 NOTE — TELEPHONE ENCOUNTER
[facility] #[id number] - [place] - [address] AT [facility] [address], [phone], [phone]   [address] [address]   Phone: [phone] Fax: [phone]   Hours: Not open 24 hours       PATIENT IS CALLING THIS MORNING.  PATIENT WAS UNDER THE ASSUMPTION DR HOOK WAS GOING TO REFILL HIS  FIORICET  VALIUM  HYDROCODONE

## 2023-06-01 NOTE — TELEPHONE ENCOUNTER
For the CGM, he'll have to see Nasreen SPEARS, because it's not just a plug and play thing and he may have to meet criteria for it.  Referral has been placed

## 2023-06-12 ENCOUNTER — TELEPHONE (OUTPATIENT)
Dept: FAMILY MEDICINE CLINIC | Facility: CLINIC | Age: 68
End: 2023-06-12

## 2023-06-12 NOTE — TELEPHONE ENCOUNTER
Pt called office requesting an Rx for meter and test strips be ordered by Arline Lilly RN,[facility]. Blade Davis has never seen this patient and we do not order prescriptions for patient's we do not see. Please order meter, test strips and lancets to pt pharmacy. The Dx code needs to be on each prescription in pharmacy notes per  Medicare guidelines. Pt can call insurance to see what preferred meter is covered under plan or you can send a Onetouch to pharmacy and if not covered the pharmacy can let you know which meter is preferred.

## 2023-06-12 NOTE — TELEPHONE ENCOUNTER
I spoke to pt and he is currently using the Accu-chek Guide Me. Auto-chek softclick lancets and Accu-chek test strips. This meter kit can be re-orderd along with test strips and lancets can be re-ordered for this patient .

## 2023-06-13 RX ORDER — BLOOD SUGAR DIAGNOSTIC
STRIP MISCELLANEOUS
Qty: 90 STRIP | Refills: 3 | Status: SHIPPED | OUTPATIENT
Start: 2023-06-13 | End: 2024-06-11

## 2023-06-13 RX ORDER — BLOOD-GLUCOSE METER
1 EACH MISCELLANEOUS DAILY
Qty: 1 KIT | Refills: 0 | Status: SHIPPED | OUTPATIENT
Start: 2023-06-13

## 2023-06-13 RX ORDER — LANCETS
1 EACH MISCELLANEOUS DAILY
Qty: 90 EACH | Refills: 3 | Status: SHIPPED | OUTPATIENT
Start: 2023-06-13 | End: 2024-06-12

## 2023-06-13 NOTE — TELEPHONE ENCOUNTER
Pt is refusing appointment with Sylvia Quick, states he does not have time to see Sylvia Quick in [facility] before he leaves for [place]. He will request reorder of test strips for current meter and see Nasreen when he returns.

## 2023-08-31 ENCOUNTER — OFFICE VISIT (OUTPATIENT)
Dept: FAMILY MEDICINE CLINIC | Facility: CLINIC | Age: 68
End: 2023-08-31
Payer: MEDICARE

## 2023-08-31 ENCOUNTER — HOSPITAL ENCOUNTER (OUTPATIENT)
Dept: GENERAL RADIOLOGY | Age: 68
Discharge: HOME | End: 2023-08-31
Attending: FAMILY MEDICINE
Payer: MEDICARE

## 2023-08-31 VITALS
TEMPERATURE: 98 F | DIASTOLIC BLOOD PRESSURE: 60 MMHG | RESPIRATION RATE: 16 BRPM | BODY MASS INDEX: 27.4 KG/M2 | OXYGEN SATURATION: 96 % | WEIGHT: 185 LBS | HEART RATE: 103 BPM | SYSTOLIC BLOOD PRESSURE: 114 MMHG | HEIGHT: 69 IN

## 2023-08-31 DIAGNOSIS — M79.672 FOOT PAIN, LEFT: Primary | ICD-10-CM

## 2023-08-31 DIAGNOSIS — M86.9 OSTEOMYELITIS OF GREAT TOE OF LEFT FOOT (HCC): ICD-10-CM

## 2023-08-31 DIAGNOSIS — M79.672 FOOT PAIN, LEFT: ICD-10-CM

## 2023-08-31 PROCEDURE — 99213 OFFICE O/P EST LOW 20 MIN: CPT | Performed by: FAMILY MEDICINE

## 2023-08-31 PROCEDURE — 73630 X-RAY EXAM OF FOOT: CPT | Performed by: FAMILY MEDICINE

## 2023-08-31 RX ORDER — BUTALBITAL, ACETAMINOPHEN AND CAFFEINE 50; 325; 40 MG/1; MG/1; MG/1
1 TABLET ORAL EVERY 4 HOURS PRN
Qty: 30 TABLET | Refills: 1 | Status: SHIPPED | OUTPATIENT
Start: 2023-08-31 | End: 2023-09-30

## 2023-08-31 RX ORDER — DIAZEPAM 5 MG/1
2.5 TABLET ORAL EVERY 6 HOURS PRN
Qty: 30 TABLET | Refills: 0 | Status: SHIPPED | OUTPATIENT
Start: 2023-08-31 | End: 2023-09-30

## 2023-08-31 RX ORDER — HYDROCODONE BITARTRATE AND ACETAMINOPHEN 10; 325 MG/1; MG/1
1 TABLET ORAL EVERY 8 HOURS PRN
Qty: 30 TABLET | Refills: 0 | Status: SHIPPED | OUTPATIENT
Start: 2023-08-31 | End: 2023-09-30

## 2023-12-12 ENCOUNTER — OFFICE VISIT (OUTPATIENT)
Dept: FAMILY MEDICINE CLINIC | Facility: CLINIC | Age: 68
End: 2023-12-12
Payer: MEDICARE

## 2023-12-12 VITALS
RESPIRATION RATE: 16 BRPM | DIASTOLIC BLOOD PRESSURE: 58 MMHG | TEMPERATURE: 98 F | OXYGEN SATURATION: 96 % | WEIGHT: 193.13 LBS | SYSTOLIC BLOOD PRESSURE: 100 MMHG | HEART RATE: 74 BPM | BODY MASS INDEX: 29 KG/M2

## 2023-12-12 DIAGNOSIS — E11.51 DIABETES MELLITUS TYPE 2 WITH PERIPHERAL ARTERY DISEASE (HCC): Primary | ICD-10-CM

## 2023-12-12 DIAGNOSIS — S98.122D: ICD-10-CM

## 2023-12-12 DIAGNOSIS — Z12.11 COLON CANCER SCREENING: ICD-10-CM

## 2023-12-12 PROCEDURE — 99213 OFFICE O/P EST LOW 20 MIN: CPT | Performed by: FAMILY MEDICINE

## 2023-12-12 RX ORDER — NITROGLYCERIN 80 MG/1
1 PATCH TRANSDERMAL DAILY
COMMUNITY
Start: 2023-07-26

## 2024-01-09 ENCOUNTER — TELEPHONE (OUTPATIENT)
Dept: FAMILY MEDICINE CLINIC | Facility: CLINIC | Age: 69
End: 2024-01-09

## 2024-01-11 ENCOUNTER — MED REC SCAN ONLY (OUTPATIENT)
Dept: FAMILY MEDICINE CLINIC | Facility: CLINIC | Age: 69
End: 2024-01-11

## 2024-02-12 RX ORDER — ATORVASTATIN CALCIUM 10 MG/1
10 TABLET, FILM COATED ORAL NIGHTLY
Qty: 90 TABLET | Refills: 1 | Status: SHIPPED | OUTPATIENT
Start: 2024-02-12

## 2024-03-28 ENCOUNTER — OFFICE VISIT (OUTPATIENT)
Dept: FAMILY MEDICINE CLINIC | Facility: CLINIC | Age: 69
End: 2024-03-28
Payer: MEDICARE

## 2024-03-28 VITALS
TEMPERATURE: 97 F | BODY MASS INDEX: 28.06 KG/M2 | DIASTOLIC BLOOD PRESSURE: 60 MMHG | WEIGHT: 196 LBS | RESPIRATION RATE: 16 BRPM | SYSTOLIC BLOOD PRESSURE: 106 MMHG | HEIGHT: 70 IN | HEART RATE: 94 BPM | OXYGEN SATURATION: 96 %

## 2024-03-28 DIAGNOSIS — Z00.00 ENCOUNTER FOR ANNUAL HEALTH EXAMINATION: ICD-10-CM

## 2024-03-28 DIAGNOSIS — K76.0 FATTY LIVER DISEASE, NONALCOHOLIC: ICD-10-CM

## 2024-03-28 DIAGNOSIS — Z12.5 PROSTATE CANCER SCREENING: ICD-10-CM

## 2024-03-28 DIAGNOSIS — E11.9 TYPE 2 DIABETES MELLITUS WITHOUT COMPLICATION, WITHOUT LONG-TERM CURRENT USE OF INSULIN (HCC): Primary | ICD-10-CM

## 2024-03-28 DIAGNOSIS — Z00.00 ENCOUNTER FOR MEDICARE ANNUAL WELLNESS EXAM: ICD-10-CM

## 2024-03-28 DIAGNOSIS — S98.112A AMPUTATION OF LEFT GREAT TOE (HCC): ICD-10-CM

## 2024-03-28 DIAGNOSIS — M72.0 DUPUYTREN'S CONTRACTURE OF BOTH HANDS: ICD-10-CM

## 2024-03-28 DIAGNOSIS — Z00.00 MEDICARE ANNUAL WELLNESS VISIT, SUBSEQUENT: ICD-10-CM

## 2024-03-28 DIAGNOSIS — Z13.1 SCREENING FOR DIABETES MELLITUS (DM): ICD-10-CM

## 2024-03-28 DIAGNOSIS — E78.2 MIXED HYPERLIPIDEMIA: ICD-10-CM

## 2024-03-28 DIAGNOSIS — Z13.6 SCREENING FOR CARDIOVASCULAR CONDITION: ICD-10-CM

## 2024-03-28 DIAGNOSIS — I65.23 CAROTID STENOSIS, BILATERAL: ICD-10-CM

## 2024-03-28 LAB
ALBUMIN SERPL-MCNC: 4 G/DL (ref 3.4–5)
ALBUMIN/GLOB SERPL: 1.1 {RATIO} (ref 1–2)
ALP LIVER SERPL-CCNC: 80 U/L
ALT SERPL-CCNC: 32 U/L
ANION GAP SERPL CALC-SCNC: 6 MMOL/L (ref 0–18)
AST SERPL-CCNC: 20 U/L (ref 15–37)
BASOPHILS # BLD AUTO: 0.06 X10(3) UL (ref 0–0.2)
BASOPHILS NFR BLD AUTO: 0.8 %
BILIRUB SERPL-MCNC: 0.5 MG/DL (ref 0.1–2)
BUN BLD-MCNC: 15 MG/DL (ref 9–23)
CALCIUM BLD-MCNC: 9.4 MG/DL (ref 8.5–10.1)
CHLORIDE SERPL-SCNC: 107 MMOL/L (ref 98–112)
CHOLEST SERPL-MCNC: 126 MG/DL (ref ?–200)
CO2 SERPL-SCNC: 27 MMOL/L (ref 21–32)
COMPLEXED PSA SERPL-MCNC: 0.64 NG/ML (ref ?–4)
CREAT BLD-MCNC: 0.92 MG/DL
CREAT UR-SCNC: 138 MG/DL
EGFRCR SERPLBLD CKD-EPI 2021: 92 ML/MIN/1.73M2 (ref 60–?)
EOSINOPHIL # BLD AUTO: 0.18 X10(3) UL (ref 0–0.7)
EOSINOPHIL NFR BLD AUTO: 2.4 %
ERYTHROCYTE [DISTWIDTH] IN BLOOD BY AUTOMATED COUNT: 13.1 %
EST. AVERAGE GLUCOSE BLD GHB EST-MCNC: 154 MG/DL (ref 68–126)
FASTING PATIENT LIPID ANSWER: YES
FASTING STATUS PATIENT QL REPORTED: YES
GLOBULIN PLAS-MCNC: 3.5 G/DL (ref 2.8–4.4)
GLUCOSE BLD-MCNC: 170 MG/DL (ref 70–99)
HBA1C MFR BLD: 7 % (ref ?–5.7)
HCT VFR BLD AUTO: 43.5 %
HDLC SERPL-MCNC: 36 MG/DL (ref 40–59)
HGB BLD-MCNC: 15.5 G/DL
IMM GRANULOCYTES # BLD AUTO: 0.06 X10(3) UL (ref 0–1)
IMM GRANULOCYTES NFR BLD: 0.8 %
LDLC SERPL CALC-MCNC: 49 MG/DL (ref ?–100)
LYMPHOCYTES # BLD AUTO: 2.4 X10(3) UL (ref 1–4)
LYMPHOCYTES NFR BLD AUTO: 31.4 %
MCH RBC QN AUTO: 32.4 PG (ref 26–34)
MCHC RBC AUTO-ENTMCNC: 35.6 G/DL (ref 31–37)
MCV RBC AUTO: 91 FL
MICROALBUMIN UR-MCNC: 0.59 MG/DL
MICROALBUMIN/CREAT 24H UR-RTO: 4.3 UG/MG (ref ?–30)
MONOCYTES # BLD AUTO: 0.63 X10(3) UL (ref 0.1–1)
MONOCYTES NFR BLD AUTO: 8.2 %
NEUTROPHILS # BLD AUTO: 4.32 X10 (3) UL (ref 1.5–7.7)
NEUTROPHILS # BLD AUTO: 4.32 X10(3) UL (ref 1.5–7.7)
NEUTROPHILS NFR BLD AUTO: 56.4 %
NONHDLC SERPL-MCNC: 90 MG/DL (ref ?–130)
OSMOLALITY SERPL CALC.SUM OF ELEC: 295 MOSM/KG (ref 275–295)
PLATELET # BLD AUTO: 224 10(3)UL (ref 150–450)
POTASSIUM SERPL-SCNC: 4.3 MMOL/L (ref 3.5–5.1)
PROT SERPL-MCNC: 7.5 G/DL (ref 6.4–8.2)
RBC # BLD AUTO: 4.78 X10(6)UL
SODIUM SERPL-SCNC: 140 MMOL/L (ref 136–145)
TRIGL SERPL-MCNC: 261 MG/DL (ref 30–149)
TSI SER-ACNC: 1.4 MIU/ML (ref 0.36–3.74)
VLDLC SERPL CALC-MCNC: 37 MG/DL (ref 0–30)
WBC # BLD AUTO: 7.7 X10(3) UL (ref 4–11)

## 2024-03-28 PROCEDURE — 85025 COMPLETE CBC W/AUTO DIFF WBC: CPT | Performed by: FAMILY MEDICINE

## 2024-03-28 PROCEDURE — 83036 HEMOGLOBIN GLYCOSYLATED A1C: CPT | Performed by: FAMILY MEDICINE

## 2024-03-28 PROCEDURE — 82043 UR ALBUMIN QUANTITATIVE: CPT | Performed by: FAMILY MEDICINE

## 2024-03-28 PROCEDURE — 84443 ASSAY THYROID STIM HORMONE: CPT | Performed by: FAMILY MEDICINE

## 2024-03-28 PROCEDURE — 80053 COMPREHEN METABOLIC PANEL: CPT | Performed by: FAMILY MEDICINE

## 2024-03-28 PROCEDURE — 80061 LIPID PANEL: CPT | Performed by: FAMILY MEDICINE

## 2024-03-28 PROCEDURE — 82570 ASSAY OF URINE CREATININE: CPT | Performed by: FAMILY MEDICINE

## 2024-03-28 NOTE — PROGRESS NOTES
Subjective:   Olvin Arguello is a 68 year old male who presents for a Medicare Subsequent Annual Wellness visit (Pt already had Initial Annual Wellness) and scheduled follow up of multiple significant but stable problems.   Olvin is here for his MWV is overdue for labs and screening. And eye exam    History/Other:   Fall Risk Assessment:   He has been screened for Falls and is High Risk. Fall Prevention information provided to patient in After Visit Summary.    Do you feel unsteady when standing or walking?: Yes  Do you worry about falling?: No  Have you fallen in the past year?: No     Cognitive Assessment:   He had a completely normal cognitive assessment - see flowsheet entries     Functional Ability/Status:   Olvin Arguello has some abnormal functions as listed below:  He has Vision problems based on screening of functional status.       Depression Screening (PHQ-2/PHQ-9): PHQ-2 SCORE: 0  , done 3/28/2024   Last [place] Suicide Screening on 3/28/2024 was No Risk.          Advanced Directives:   He does have a Living Will but we do NOT have it on file in Epic.    He has a Power of  for Health Care on file in [facility].  Discussed Advance Care Planning with patient (and family/surrogate if present). Standard forms made available to patient in After Visit Summary.      Patient Active Problem List   Diagnosis    Carotid stenosis, bilateral    Fatty liver disease, nonalcoholic    Hyperlipidemia    Dupuytren's contracture of both hands    Screening for diabetes mellitus (DM)    Contracture of palmar fascia    Type 2 diabetes mellitus without complication, without long-term current use of insulin (HCC)    Diabetes mellitus, type 2 (pick complications) (HCC)    Diabetes mellitus type 2, noninsulin dependent (HCC)    Amputation of left great toe (HCC)     Allergies:  He has No Known Allergies.    Current Medications:  Outpatient Medications Marked as Taking for the 3/28/24 encounter (Office Visit) with Jose Daniel Leon  DO   Medication Sig    atorvastatin 10 MG Oral Tab Take 1 tablet (10 mg total) by mouth nightly.    butalbital-acetaminophen-caffeine -40 MG Oral Tab Take 1 tablet by mouth every 4 (four) hours as needed.    diazePAM (VALIUM) 5 MG Oral Tab Take 0.5 tablets (2.5 mg total) by mouth every 6 (six) hours as needed for Anxiety.    nitroglycerin 0.4 MG/HR Transdermal Patch 24 Hr Place 1 patch onto the skin daily. REMOVE AFTER 12 HOURS    Accu-Chek Softclix Lancets Does not apply Misc 1 Lancet by Finger stick route daily. Use as directed.    Blood Glucose Monitoring Suppl (ACCU-CHEK GUIDE ME) w/Device Does not apply Kit 1 kit daily. Check glucose once daily    ammonium lactate 12 % External Lotion APPLY TOPICALLY TO THE AFFECTED AREA ONCE A DAY    aspirin 81 MG Oral Tab EC Take 1 tablet (81 mg total) by mouth daily.    Multiple Vitamins-Minerals (MENS MULTIVITAMIN PLUS) Oral Tab Take by mouth.    GARLIC OR Take by mouth.       Medical History:  He  has no past medical history on file.  Surgical History:  He  has a past surgical history that includes lumbar spine fusion combined; other surgical history (Right); and other surgical history.   Family History:  His family history is not on file.  Social History:  He  reports that he has never smoked. He has never used smokeless tobacco. He reports that he does not currently use alcohol. He reports current drug use. Drug: Cannabis.    Tobacco:  He has never smoked tobacco.    CAGE Alcohol Screen:   CAGE screening score of 0 on 3/28/2024, showing low risk of alcohol abuse.      Patient Care Team:  Jose Daniel Leon DO as PCP - General (Family Practice)    Review of Systems  GENERAL: feels well otherwise  SKIN: denies any unusual skin lesions  EYES: denies blurred vision or double vision  HEENT: denies nasal congestion, sinus pain or ST  LUNGS: denies shortness of breath with exertion  CARDIOVASCULAR: denies chest pain on exertion  GI: denies abdominal pain, denies  heartburn  : 1 per night nocturia, no complaint of urinary incontinence  MUSCULOSKELETAL: denies back pain  NEURO: denies headaches  PSYCHE: denies depression or anxiety  HEMATOLOGIC: denies hx of anemia  ENDOCRINE: denies thyroid history  ALL/ASTHMA: denies hx of allergy or asthma    Objective:   Physical Exam  General Appearance:  Alert, cooperative, no distress, appears stated age   Head:  Normocephalic, without obvious abnormality, atraumatic   Eyes:  PERRL, conjunctiva/corneas clear, EOM's intact, both eyes   Ears:  Normal TM's and external ear canals, both ears   Nose: Nares normal, septum midline, mucosa normal, no drainage or sinus tenderness   Throat: Lips, mucosa, and tongue normal; teeth and gums normal   Neck: Supple, symmetrical, trachea midline, no adenopathy, thyroid: not enlarged, symmetric, no tenderness/mass/nodules, no carotid bruit or JVD   Back:   Symmetric, no curvature, ROM normal, no CVA tenderness   Lungs:   Clear to auscultation bilaterally, respirations unlabored   Chest Wall:  No tenderness or deformity   Heart:  Regular rate and rhythm, S1, S2 normal, no murmur, rub or gallop   Abdomen:   Soft, non-tender, bowel sounds active all four quadrants,  no masses, no organomegaly   Genitalia: Normal male   Rectal: Normal tone, normal prostate, no masses or tenderness   Extremities: Extremities normal, atraumatic, no cyanosis or edema   Pulses: 2+ and symmetric   Skin: Skin color, texture, turgor normal, no rashes or lesions   Lymph nodes: Cervical, supraclavicular, and axillary nodes normal   Neurologic: Normal     /60   Pulse 94   Temp 97.1 °F (36.2 °C) (Temporal)   Resp 16   Ht 5' 10\" (1.778 m)   Wt 196 lb (88.9 kg)   SpO2 96%   BMI 28.12 kg/m²  Estimated body mass index is 28.12 kg/m² as calculated from the following:    Height as of this encounter: 5' 10\" (1.778 m).    Weight as of this encounter: 196 lb (88.9 kg).    Medicare Hearing Assessment:   Hearing Screening    Time  taken: 3/28/2024 11:13 AM  Screening Method: Finger Rub  Finger Rub Result: Pass         Visual Acuity:   Right Eye Visual Acuity: Uncorrected Right Eye Chart Acuity: 20/50   Left Eye Visual Acuity: Uncorrected Left Eye Chart Acuity: 20/50   Both Eyes Visual Acuity: Uncorrected Both Eyes Chart Acuity: 20/50   Able To Tolerate Visual Acuity: Yes        Assessment & Plan:   Olvin Arguello is a 68 year old male who presents for a Medicare Assessment.   1 Medicare annual wellness visit, subsequent  -     PSA Total, Screen; Future; Expected date: 03/28/2024  -     Lipid Panel  -     Comp Metabolic Panel (14)  -     Hemoglobin A1C  -     TSH W Reflex To Free T4  -     CBC With Differential With Platelet  -     Microalb/Creat Ratio, Random Urine  2. Type 2 diabetes mellitus without complication, without long-term current use of insulin (HCC) (Primary)  -     Hemoglobin A1C  -     Microalb/Creat Ratio, Random Urine, METFORMIN 500 MG BID, LISINOPRIL 2.5 MG DAILY, ATORVASTATIN 10 MG DAILY  3. Mixed hyperlipidemia  -     Lipid Panel, ATORVASTATIN 10 MG DAILY  4. Carotid stenosis, bilateral  -     Lipid Panel, ATORVASTATIN 10 MG AND, ASA DAILY  5. Fatty liver disease, nonalcoholic  -     Lipid Panel  -     Comp Metabolic Panel (14), CONTINUE STATIN DAILY AVOID ETOH FATTY AND FIRED FOODS  6. Dupuytren's contracture of both hands  -     Hemoglobin A1C  -     TSH W Reflex To Free T4, and both feet  7. Prostate cancer screening  -     PSA Total, Screen; Future; Expected date: 03/28/2024  8. Screening for cardiovascular condition  -     Lipid Panel  9. Encounter for annual health examination  -     PSA Total, Screen; Future; Expected date: 03/28/2024  -     Lipid Panel  -     Comp Metabolic Panel (14)  -     Hemoglobin A1C  -     TSH W Reflex To Free T4  -     CBC With Differential With Platelet  -     Microalb/Creat Ratio, Random Urine  10. Encounter for Medicare annual wellness exam  -     PSA Total, Screen; Future; Expected date:  03/28/2024  -     Lipid Panel  -     Comp Metabolic Panel (14)  -     Hemoglobin A1C  -     TSH W Reflex To Free T4  -     CBC With Differential With Platelet  -     Microalb/Creat Ratio, Random Urine  11. Screening for diabetes mellitus (DM)  -     Hemoglobin A1C  12. Amputation of left great toe(HCC), sees podiatry may need revision, to get a Pumice stone and debride the callous after shower, follow up with podiatry  The patient indicates understanding of these issues and agrees to the plan.  Reinforced healthy diet, lifestyle, and exercise.      Return in 1 year (on 3/28/2025).     Jose Daniel Leon DO, 3/28/2024     Supplementary Documentation:   General Health:  In the past six months, have you lost more than 10 pounds without trying?: 2 - No  Has your appetite been poor?: No  Type of Diet: Balanced;Diabetic  How does the patient maintain a good energy level?: Appropriate Exercise;Daily Walks  How would you describe your daily physical activity?: Moderate  How would you describe your current health state?: Good  How do you maintain positive mental well-being?: Social Interaction;Visiting Friends;Visiting Family  On a scale of 0 to 10, with 0 being no pain and 10 being severe pain, what is your pain level?: 5 - (Moderate)  In the past six months, have you experienced urine leakage?: 0-No  At any time do you feel concerned for the safety/well-being of yourself and/or your children, in your home or elsewhere?: Kayleen Arguello's SCREENING SCHEDULE   Tests on this list are recommended by your physician but may not be covered, or covered at this frequency, by your insurer.   Please check with your insurance carrier before scheduling to verify coverage.   PREVENTATIVE SERVICES FREQUENCY &  COVERAGE DETAILS LAST COMPLETION DATE   Diabetes Screening    Fasting Blood Sugar / Glucose    One screening every 12 months if never tested or if previously tested but not diagnosed with pre-diabetes   One screening every 6  months if diagnosed with pre-diabetes Lab Results   Component Value Date     (H) 03/13/2023        Cardiovascular Disease Screening    Lipid Panel  Cholesterol  Lipoprotein (HDL)  Triglycerides Covered every 5 years for all Medicare beneficiaries without apparent signs or symptoms of cardiovascular disease Lab Results   Component Value Date    CHOLEST 134 03/07/2023    HDL 33 (L) 03/07/2023    LDL 43 03/07/2023    LDLD 109 (H) 12/01/2020    TRIG 395 (H) 03/07/2023         Electrocardiogram (EKG)   Covered if needed at Welcome to Medicare, and non-screening if indicated for medical reasons 12/24/2020      Ultrasound Screening for Abdominal Aortic Aneurysm (AAA) Covered once in a lifetime for one of the following risk factors    Men who are 65-75 years old and have ever smoked    Anyone with a family history -     Colorectal Cancer Screening  Covered for ages 50-85; only need ONE of the following:    Colonoscopy   Covered every 10 years    Covered every 2 years if patient is at high risk or previous colonoscopy was abnormal 03/14/2020    Health Maintenance   Topic Date Due    Colorectal Cancer Screening  01/03/2027       Flexible Sigmoidoscopy   Covered every 4 years -    Fecal Occult Blood Test Covered annually -   Prostate Cancer Screening    Prostate-Specific Antigen (PSA) Annually No results found for: \"PSA\"  Health Maintenance   Topic Date Due    PSA  03/07/2025      Immunizations    Influenza Covered once per flu season  Please get every year -  Influenza Vaccine(1) Never done    Pneumococcal Each vaccine ([id number] & [id number]) covered once after 65 Prevnar 13: -    [id number]: -     Pneumococcal Vaccination(1 of 2 - PCV) Never done    Hepatitis B One screening covered for patients with certain risk factors   -  No recommendations at this time    Tetanus Toxoid Not covered by Medicare Part B unless medically necessary (cut with metal); may be covered with your pharmacy prescription benefits -     Tetanus, Diptheria and Pertusis TD and TDaP Not covered by Medicare Part B -  No recommendations at this time    Zoster Not covered by Medicare Part B; may be covered with your pharmacy  prescription benefits -  Zoster Vaccines(1 of 2) Never done     Diabetes      Hemoglobin A1C Annually; if last result is elevated, may be asked to retest more frequently.    Medicare covers every 3 months Lab Results   Component Value Date     (H) 03/07/2023    A1C 6.9 (H) 03/07/2023       Hemoglobin A1C Test due on 09/07/2023    Creat/alb ratio Annually Lab Results   Component Value Date    MICROALBCREA 6.4 03/07/2023       LDL Annually Lab Results   Component Value Date    LDL 43 03/07/2023       Dilated Eye Exam Annually Last Diabetic Eye Exam:  No data recorded  No data recorded       Annual Monitoring of Persistent Medications (ACE/ARB, digoxin diuretics, anticonvulsants)    Potassium Annually Lab Results   Component Value Date    K 4.9 03/13/2023         Creatinine   Annually Lab Results   Component Value Date    CREATSERUM 0.89 03/13/2023         BUN Annually Lab Results   Component Value Date    BUN 15 03/13/2023       Drug Serum Conc Annually No results found for: \"DIGOXIN\", \"DIG\", \"VALP\"

## 2024-03-28 NOTE — PATIENT INSTRUCTIONS
Olvin Arguello's SCREENING SCHEDULE   Tests on this list are recommended by your physician but may not be covered, or covered at this frequency, by your insurer.   Please check with your insurance carrier before scheduling to verify coverage.   PREVENTATIVE SERVICES FREQUENCY &  COVERAGE DETAILS LAST COMPLETION DATE   Diabetes Screening    Fasting Blood Sugar / Glucose    One screening every 12 months if never tested or if previously tested but not diagnosed with pre-diabetes   One screening every 6 months if diagnosed with pre-diabetes Lab Results   Component Value Date     (H) 03/13/2023        Cardiovascular Disease Screening    Lipid Panel  Cholesterol  Lipoprotein (HDL)  Triglycerides Covered every 5 years for all Medicare beneficiaries without apparent signs or symptoms of cardiovascular disease Lab Results   Component Value Date    CHOLEST 134 03/07/2023    HDL 33 (L) 03/07/2023    LDL 43 03/07/2023    LDLD 109 (H) 12/01/2020    TRIG 395 (H) 03/07/2023         Electrocardiogram (EKG)   Covered if needed at Welcome to Medicare, and non-screening if indicated for medical reasons 12/24/2020      Ultrasound Screening for Abdominal Aortic Aneurysm (AAA) Covered once in a lifetime for one of the following risk factors   • Men who are 65-75 years old and have ever smoked   • Anyone with a family history -     Colorectal Cancer Screening  Covered for ages 50-85; only need ONE of the following:    Colonoscopy   Covered every 10 years    Covered every 2 years if patient is at high risk or previous colonoscopy was abnormal 03/14/2020    Health Maintenance   Topic Date Due   • Colorectal Cancer Screening  01/03/2027       Flexible Sigmoidoscopy   Covered every 4 years -    Fecal Occult Blood Test Covered annually -   Prostate Cancer Screening    Prostate-Specific Antigen (PSA) Annually No results found for: \"PSA\"  Health Maintenance   Topic Date Due   • PSA  03/07/2025      Immunizations    Influenza Covered once  per flu season  Please get every year -  Influenza Vaccine(1) Never done    Pneumococcal Each vaccine ([id number] & [id number]) covered once after 65 Prevnar 13: -    [id number]: -     Pneumococcal Vaccination(1 of 2 - PCV) Never done    Hepatitis B One screening covered for patients with certain risk factors   -  No recommendations at this time    Tetanus Toxoid Not covered by Medicare Part B unless medically necessary (cut with metal); may be covered with your pharmacy prescription benefits -    Tetanus, Diptheria and Pertusis TD and TDaP Not covered by Medicare Part B -  No recommendations at this time    Zoster Not covered by Medicare Part B; may be covered with your pharmacy  prescription benefits -  Zoster Vaccines(1 of 2) Never done     Diabetes      Hemoglobin A1C Annually; if last result is elevated, may be asked to retest more frequently.    Medicare covers every 3 months Lab Results   Component Value Date     (H) 03/07/2023    A1C 6.9 (H) 03/07/2023       Hemoglobin A1C Test due on 09/07/2023    Creat/alb ratio Annually Lab Results   Component Value Date    MICROALBCREA 6.4 03/07/2023       LDL Annually Lab Results   Component Value Date    LDL 43 03/07/2023       Dilated Eye Exam Annually [unfilled]     Annual Monitoring of Persistent Medications (ACE/ARB, digoxin diuretics, anticonvulsants)    Potassium Annually Lab Results   Component Value Date    K 4.9 03/13/2023         Creatinine   Annually Lab Results   Component Value Date    CREATSERUM 0.89 03/13/2023         BUN Annually Lab Results   Component Value Date    BUN 15 03/13/2023       Drug Serum Conc Annually No results found for: \"DIGOXIN\", \"DIG\", \"VALP\"

## 2024-03-29 ENCOUNTER — TELEPHONE (OUTPATIENT)
Dept: FAMILY MEDICINE CLINIC | Facility: CLINIC | Age: 69
End: 2024-03-29

## 2024-03-29 DIAGNOSIS — E11.9 TYPE 2 DIABETES MELLITUS WITHOUT COMPLICATION, WITHOUT LONG-TERM CURRENT USE OF INSULIN (HCC): Primary | ICD-10-CM

## 2024-03-29 DIAGNOSIS — E78.2 MIXED HYPERLIPIDEMIA: ICD-10-CM

## 2024-03-29 NOTE — TELEPHONE ENCOUNTER
----- Message from Jose Daniel Leon DO sent at 3/29/2024  7:20 AM CDT -----  Please notify Olvin, that overall his labs looked very good. His thyroid, blood count, kidney and liver function tests looked good, as did his prostate. His BS control was not bad at 7, but I'd like to see him closer to 6. I know he has significant restrictions given all his foot issues, but he may benefit from maybe a recumbent bike? For exercise. Finally his cholesterol looked good Triglycerides still elevated, which I think is genetic , but if can get his BS down a bit more, it should follow, lets recall A1c in 6 moths and lipid

## 2024-03-29 NOTE — TELEPHONE ENCOUNTER
Patient's name and  verified  Placed reminder in the system  Patient notified and verbalized an understanding

## 2024-04-05 ENCOUNTER — HOSPITAL ENCOUNTER (OUTPATIENT)
Dept: ULTRASOUND IMAGING | Age: 69
Discharge: HOME | End: 2024-04-05
Attending: FAMILY MEDICINE
Payer: MEDICARE

## 2024-04-05 DIAGNOSIS — I65.23 CAROTID STENOSIS, BILATERAL: ICD-10-CM

## 2024-04-05 PROCEDURE — 93880 EXTRACRANIAL BILAT STUDY: CPT | Performed by: FAMILY MEDICINE

## 2024-04-06 ENCOUNTER — TELEPHONE (OUTPATIENT)
Dept: FAMILY MEDICINE CLINIC | Facility: CLINIC | Age: 69
End: 2024-04-06

## 2024-04-06 NOTE — TELEPHONE ENCOUNTER
----- Message from Jose Daniel Leon DO sent at 4/6/2024 10:51 AM CDT -----  Please notify Olvin that his US showed he has mild cholesterol build up in the arteries in his neck. So important for him to stay on the cholesterol lowering medicine and an 81 mg ASA daily

## 2024-04-06 NOTE — PROGRESS NOTES
Please notify Olvin that his US showed he has mild cholesterol build up in the arteries in his neck. So important for him to stay on the cholesterol lowering medicine and an 81 mg ASA daily

## 2024-04-22 DIAGNOSIS — F43.0 ANXIETY AS ACUTE REACTION TO GROSS STRESS: ICD-10-CM

## 2024-04-22 DIAGNOSIS — F41.1 ANXIETY AS ACUTE REACTION TO GROSS STRESS: ICD-10-CM

## 2024-04-22 DIAGNOSIS — M72.0 DUPUYTREN'S CONTRACTURE OF BOTH HANDS: ICD-10-CM

## 2024-04-22 RX ORDER — DIAZEPAM 5 MG/1
2.5 TABLET ORAL EVERY 6 HOURS PRN
Qty: 30 TABLET | Refills: 0 | Status: SHIPPED | OUTPATIENT
Start: 2024-04-22 | End: 2024-05-22

## 2024-04-22 RX ORDER — BUTALBITAL, ACETAMINOPHEN AND CAFFEINE 50; 325; 40 MG/1; MG/1; MG/1
1 TABLET ORAL EVERY 4 HOURS PRN
Qty: 30 TABLET | Refills: 1 | Status: SHIPPED | OUTPATIENT
Start: 2024-04-22 | End: 2024-05-22

## 2024-04-22 NOTE — TELEPHONE ENCOUNTER
PT CALLED AND ADV IS TRAVELING TO SEE A FRIEND ON HOSPICE - MAY BE GONE FOR ABOUT 4 WEEKS, NEEDS REFILLS:    butalbital-acetaminophen-caffeine -40 MG Oral Tab     diazePAM (VALIUM) 5 MG Oral Tab     CAESAR GUTIÉRREZ [address]       THANK YOU

## 2024-04-24 ENCOUNTER — MED REC SCAN ONLY (OUTPATIENT)
Dept: FAMILY MEDICINE CLINIC | Facility: CLINIC | Age: 69
End: 2024-04-24

## 2024-04-24 ENCOUNTER — TELEPHONE (OUTPATIENT)
Dept: FAMILY MEDICINE CLINIC | Facility: CLINIC | Age: 69
End: 2024-04-24

## 2024-08-13 DIAGNOSIS — M72.0 DUPUYTREN'S CONTRACTURE OF BOTH HANDS: ICD-10-CM

## 2024-08-13 DIAGNOSIS — F43.0 ANXIETY AS ACUTE REACTION TO GROSS STRESS: ICD-10-CM

## 2024-08-13 DIAGNOSIS — F41.1 ANXIETY AS ACUTE REACTION TO GROSS STRESS: ICD-10-CM

## 2024-08-13 RX ORDER — DIAZEPAM 5 MG/1
2.5 TABLET ORAL EVERY 6 HOURS PRN
Qty: 30 TABLET | Refills: 0 | Status: SHIPPED | OUTPATIENT
Start: 2024-08-13 | End: 2024-09-12

## 2024-08-13 RX ORDER — BUTALBITAL, ACETAMINOPHEN AND CAFFEINE 50; 325; 40 MG/1; MG/1; MG/1
1 TABLET ORAL EVERY 4 HOURS PRN
Qty: 30 TABLET | Refills: 1 | Status: SHIPPED | OUTPATIENT
Start: 2024-08-13 | End: 2024-09-12

## 2024-08-13 NOTE — TELEPHONE ENCOUNTER
[id number] - [place], [place] - [address] AT [facility] [address], [phone], [phone]   [address] [facility] [address]   Phone: [phone] Fax: [phone]   Hours: Not open 24 hours     PATIENT REQUESTING REFILL              butalbital-acetaminophen-caffeine -40 MG Oral Tab 30 tablet 1 4/22/2024 5/22/2024   Sig:   Take 1 tablet by mouth every 4 (four) hours as needed.     Route:   Oral                diazePAM (VALIUM) 5 MG Oral Tab 30 tablet 0 4/22/2024 5/22/2024   Sig:   Take 0.5 tablets (2.5 mg total) by mouth every 6 (six) hours as needed for Anxiety.     Route:   Oral     PRN Reason(s):   Anxiety

## 2024-08-13 NOTE — TELEPHONE ENCOUNTER
Routing to provider per protocol.   butalbital-acetaminophen-caffeine -40 MG Oral Tab   Last refilled on 4/22/24 for #30  with 1 rf.     diazePAM (VALIUM) 5 MG Oral Tab   Last refilled on 4/22/24 for #30  with 0 rf.     Last labs 3/28/24.   Last seen on 3/28/24.     No future appointments.       Thank you.

## 2024-08-20 RX ORDER — BLOOD SUGAR DIAGNOSTIC
STRIP MISCELLANEOUS
Qty: 100 STRIP | Refills: 0 | Status: SHIPPED | OUTPATIENT
Start: 2024-08-20

## 2024-08-20 NOTE — TELEPHONE ENCOUNTER
Diabetic Supplies Protocol Aqtzly2408/20/2024 10:17 AM   Protocol Details In person appointment or virtual visit in the past 12 mos or appointment in next 3 mos      Refilled per protocol  Blood Glucose Monitoring Suppl (ACCU-CHEK GUIDE ME) w/Device Does not apply Kit   Last refilled on 6/13/23 #1 kit with 0 rf.  LOV for wellness-3/28/24  Last labs- 3/28/24    Sent to pharmacy

## 2024-08-23 RX ORDER — ATORVASTATIN CALCIUM 10 MG/1
10 TABLET, FILM COATED ORAL NIGHTLY
Qty: 90 TABLET | Refills: 1 | Status: SHIPPED | OUTPATIENT
Start: 2024-08-23

## 2024-08-23 NOTE — TELEPHONE ENCOUNTER
Cholesterol Medication Protocol [id number]/23/2024 10:09 AM   Protocol Details ALT < 80    ALT resulted within past year    Lipid panel within past 12 months    In person appointment or virtual visit in the past 12 mos or appointment in next 3 mos   Refilled per protocol  atorvastatin 10 MG Oral Tab   Last refilled on 2/12/24 #90 with 1 rf.  LOV- 3/28/24  Last labs- 3/28/24    Sent to pharmacy

## 2024-09-04 ENCOUNTER — MED REC SCAN ONLY (OUTPATIENT)
Dept: FAMILY MEDICINE CLINIC | Facility: CLINIC | Age: 69
End: 2024-09-04

## 2024-10-01 ENCOUNTER — TELEPHONE (OUTPATIENT)
Dept: FAMILY MEDICINE CLINIC | Facility: CLINIC | Age: 69
End: 2024-10-01

## 2024-10-01 NOTE — TELEPHONE ENCOUNTER
Letter mailed to patient reminding him he is due for labs per patient reminder.    Lab Frequency Next Occurrence   Hemoglobin A1C Once 09/29/2024   Lipid Panel [E] Once 09/29/2024     lipid and A1c  Received: 2 days ago  Cheyenne Salcedo RN P Sikic, Daniel Nurse

## 2024-10-22 DIAGNOSIS — M72.0 DUPUYTREN'S CONTRACTURE OF BOTH HANDS: ICD-10-CM

## 2024-10-22 DIAGNOSIS — M79.672 FOOT PAIN, LEFT: ICD-10-CM

## 2024-10-22 DIAGNOSIS — F43.0 ANXIETY AS ACUTE REACTION TO GROSS STRESS: ICD-10-CM

## 2024-10-22 DIAGNOSIS — S98.122D: ICD-10-CM

## 2024-10-22 DIAGNOSIS — F41.1 ANXIETY AS ACUTE REACTION TO GROSS STRESS: ICD-10-CM

## 2024-10-22 NOTE — TELEPHONE ENCOUNTER
Patient requests refill    Patient states he took his \"on hand\" antibiotics while out of town last week and the amputated toe started getting irritated    He will be going out of town again and wants to have all the meds on hand    Accu-Chek Softclix Lancets     diazePAM (VALIUM) 5 MG Oral Tab     butalbital-acetaminophen-caffeine -40 MG Oral Tab     HYDROcodone-acetaminophen  MG Oral Tab     Amoxicillin    Ricardo Coreas [address]

## 2024-10-22 NOTE — TELEPHONE ENCOUNTER
Please see patient note below- he is requesting medications to be filled as he is going out of town.    Last office visit 3/28/24  Patient is requesting  Butalbital  Norco  Valium  Amoxicillin  Accu Check Lancets    Patient states he is out of his amox that he keeps on hand incase he needs it?

## 2024-10-23 RX ORDER — AMOXICILLIN 500 MG/1
TABLET, FILM COATED ORAL
Qty: 4 TABLET | Refills: 0 | Status: SHIPPED | OUTPATIENT
Start: 2024-10-23

## 2024-10-23 RX ORDER — BUTALBITAL, ACETAMINOPHEN AND CAFFEINE 50; 325; 40 MG/1; MG/1; MG/1
1 TABLET ORAL EVERY 4 HOURS PRN
Qty: 30 TABLET | Refills: 1 | Status: SHIPPED | OUTPATIENT
Start: 2024-10-23 | End: 2024-11-22

## 2024-10-23 RX ORDER — DIAZEPAM 5 MG/1
2.5 TABLET ORAL EVERY 6 HOURS PRN
Qty: 30 TABLET | Refills: 0 | Status: SHIPPED | OUTPATIENT
Start: 2024-10-23 | End: 2024-11-22

## 2024-10-23 RX ORDER — LANCETS
1 EACH MISCELLANEOUS DAILY
Qty: 100 EACH | Refills: 2 | Status: SHIPPED | OUTPATIENT
Start: 2024-10-23 | End: 2025-10-23

## 2024-10-23 RX ORDER — HYDROCODONE BITARTRATE AND ACETAMINOPHEN 10; 325 MG/1; MG/1
1 TABLET ORAL EVERY 8 HOURS PRN
Qty: 30 TABLET | Refills: 0 | Status: SHIPPED | OUTPATIENT
Start: 2024-10-23 | End: 2024-11-22

## 2024-11-25 RX ORDER — BLOOD SUGAR DIAGNOSTIC
STRIP MISCELLANEOUS DAILY
Qty: 100 STRIP | Refills: 0 | Status: SHIPPED | OUTPATIENT
Start: 2024-11-25

## 2024-11-25 NOTE — TELEPHONE ENCOUNTER
Diabetic Supplies Protocol Kolmnw3111/23/2024 03:22 PM   Protocol Details In person appointment or virtual visit in the past 12 mos or appointment in next 3 mos     Refilled per protocol  Glucose Blood (ACCU-CHEK GUIDE) In Vitro Strip   Last refilled on 8/20/24 #100 with 0 rf.  LOV- 3/28/24  Last labs- 3/28/24    Sent to pharmacy

## 2024-12-02 ENCOUNTER — TELEPHONE (OUTPATIENT)
Dept: FAMILY MEDICINE CLINIC | Facility: CLINIC | Age: 69
End: 2024-12-02

## 2024-12-02 DIAGNOSIS — E11.9 TYPE 2 DIABETES MELLITUS WITHOUT COMPLICATION, WITHOUT LONG-TERM CURRENT USE OF INSULIN (HCC): Primary | ICD-10-CM

## 2024-12-02 NOTE — TELEPHONE ENCOUNTER
Patient is coming in for fasting labs tomorrow for Dr. Leon    Labs ordered in 2024 are     Can primary care physician please review labs and order what patient will need

## 2024-12-03 ENCOUNTER — NURSE ONLY (OUTPATIENT)
Dept: FAMILY MEDICINE CLINIC | Facility: CLINIC | Age: 69
End: 2024-12-03
Payer: MEDICARE

## 2024-12-03 DIAGNOSIS — L08.9 INFECTION OF SKIN AND SUBCUTANEOUS TISSUE: Primary | ICD-10-CM

## 2024-12-03 LAB
ALBUMIN SERPL-MCNC: 4.6 G/DL (ref 3.2–4.8)
ALBUMIN/GLOB SERPL: 1.4 {RATIO} (ref 1–2)
ALP LIVER SERPL-CCNC: 83 U/L
ALT SERPL-CCNC: 27 U/L
ANION GAP SERPL CALC-SCNC: 9 MMOL/L (ref 0–18)
AST SERPL-CCNC: 28 U/L (ref ?–34)
BASOPHILS # BLD AUTO: 0.05 X10(3) UL (ref 0–0.2)
BASOPHILS NFR BLD AUTO: 0.7 %
BILIRUB SERPL-MCNC: 0.5 MG/DL (ref 0.2–1.1)
BUN BLD-MCNC: 15 MG/DL (ref 9–23)
CALCIUM BLD-MCNC: 10.1 MG/DL (ref 8.7–10.4)
CHLORIDE SERPL-SCNC: 104 MMOL/L (ref 98–112)
CHOLEST SERPL-MCNC: 156 MG/DL (ref ?–200)
CO2 SERPL-SCNC: 25 MMOL/L (ref 21–32)
CREAT BLD-MCNC: 0.94 MG/DL
CRP SERPL-MCNC: <0.4 MG/DL (ref ?–0.5)
EGFRCR SERPLBLD CKD-EPI 2021: 89 ML/MIN/1.73M2 (ref 60–?)
EOSINOPHIL # BLD AUTO: 0.16 X10(3) UL (ref 0–0.7)
EOSINOPHIL NFR BLD AUTO: 2.4 %
ERYTHROCYTE [DISTWIDTH] IN BLOOD BY AUTOMATED COUNT: 13.1 %
ERYTHROCYTE [SEDIMENTATION RATE] IN BLOOD: 10 MM/HR
EST. AVERAGE GLUCOSE BLD GHB EST-MCNC: 157 MG/DL (ref 68–126)
FASTING PATIENT LIPID ANSWER: YES
FASTING STATUS PATIENT QL REPORTED: YES
GLOBULIN PLAS-MCNC: 3.2 G/DL (ref 2–3.5)
GLUCOSE BLD-MCNC: 141 MG/DL (ref 70–99)
HBA1C MFR BLD: 7.1 % (ref ?–5.7)
HCT VFR BLD AUTO: 45.1 %
HDLC SERPL-MCNC: 37 MG/DL (ref 40–59)
HGB BLD-MCNC: 15.7 G/DL
IMM GRANULOCYTES # BLD AUTO: 0.05 X10(3) UL (ref 0–1)
IMM GRANULOCYTES NFR BLD: 0.7 %
LDLC SERPL CALC-MCNC: 64 MG/DL (ref ?–100)
LYMPHOCYTES # BLD AUTO: 2.05 X10(3) UL (ref 1–4)
LYMPHOCYTES NFR BLD AUTO: 30.6 %
MCH RBC QN AUTO: 32.2 PG (ref 26–34)
MCHC RBC AUTO-ENTMCNC: 34.8 G/DL (ref 31–37)
MCV RBC AUTO: 92.4 FL
MONOCYTES # BLD AUTO: 0.5 X10(3) UL (ref 0.1–1)
MONOCYTES NFR BLD AUTO: 7.5 %
NEUTROPHILS # BLD AUTO: 3.89 X10 (3) UL (ref 1.5–7.7)
NEUTROPHILS # BLD AUTO: 3.89 X10(3) UL (ref 1.5–7.7)
NEUTROPHILS NFR BLD AUTO: 58.1 %
NONHDLC SERPL-MCNC: 119 MG/DL (ref ?–130)
OSMOLALITY SERPL CALC.SUM OF ELEC: 289 MOSM/KG (ref 275–295)
PLATELET # BLD AUTO: 231 10(3)UL (ref 150–450)
POTASSIUM SERPL-SCNC: 4.6 MMOL/L (ref 3.5–5.1)
PROT SERPL-MCNC: 7.8 G/DL (ref 5.7–8.2)
RBC # BLD AUTO: 4.88 X10(6)UL
SODIUM SERPL-SCNC: 138 MMOL/L (ref 136–145)
TRIGL SERPL-MCNC: 351 MG/DL (ref 30–149)
VLDLC SERPL CALC-MCNC: 53 MG/DL (ref 0–30)
WBC # BLD AUTO: 6.7 X10(3) UL (ref 4–11)

## 2024-12-03 PROCEDURE — 80053 COMPREHEN METABOLIC PANEL: CPT | Performed by: FAMILY MEDICINE

## 2024-12-03 PROCEDURE — 86140 C-REACTIVE PROTEIN: CPT | Performed by: FAMILY MEDICINE

## 2024-12-03 PROCEDURE — 80061 LIPID PANEL: CPT | Performed by: FAMILY MEDICINE

## 2024-12-03 PROCEDURE — 85652 RBC SED RATE AUTOMATED: CPT | Performed by: FAMILY MEDICINE

## 2024-12-03 PROCEDURE — 83036 HEMOGLOBIN GLYCOSYLATED A1C: CPT | Performed by: FAMILY MEDICINE

## 2024-12-03 PROCEDURE — 85025 COMPLETE CBC W/AUTO DIFF WBC: CPT | Performed by: FAMILY MEDICINE

## 2024-12-03 NOTE — PROGRESS NOTES
Patient was in office for labs- ordered byDr. Leon and from [facility]   Fax Results to [phone]    1 mint and 1 lavender tube collected from R AC using butterfly needle and 1 attempt    Pt tolerated and was sent home in stable condition

## 2024-12-04 ENCOUNTER — TELEPHONE (OUTPATIENT)
Dept: FAMILY MEDICINE CLINIC | Facility: CLINIC | Age: 69
End: 2024-12-04

## 2024-12-04 DIAGNOSIS — E11.9 TYPE 2 DIABETES MELLITUS WITHOUT COMPLICATION, WITHOUT LONG-TERM CURRENT USE OF INSULIN (HCC): Primary | ICD-10-CM

## 2024-12-04 NOTE — TELEPHONE ENCOUNTER
----- Message from Jose Daniel Leon sent at 12/4/2024  7:44 AM CST -----  Please notify Chepe, his cholesterol looks good, but his  triglycerides are still elevated, probably because his  Hemoglobin A1c is still running in the 7's which translates to a BLOOD SUGAR average of 150-160, when your blood sugar is elevated the Triglycerides will also be elevated. Is she still taking the metformin? 500 mg once daily, because if so we have room to go up, . Ideally I'd like to see his Hemoglobin A1c in 6's and that will help his Triglycerides as well. We would then rechk labs in about 4 months   Also all his inflammatory markers came back normal       Left message with friend to have patient call back    Spoke with patient, advised note above    Patient verbally understood

## 2024-12-10 ENCOUNTER — OFFICE VISIT (OUTPATIENT)
Dept: FAMILY MEDICINE CLINIC | Facility: CLINIC | Age: 69
End: 2024-12-10
Payer: MEDICARE

## 2024-12-10 ENCOUNTER — HOSPITAL ENCOUNTER (OUTPATIENT)
Dept: GENERAL RADIOLOGY | Age: 69
Discharge: HOME | End: 2024-12-10
Attending: FAMILY MEDICINE
Payer: MEDICARE

## 2024-12-10 VITALS
SYSTOLIC BLOOD PRESSURE: 104 MMHG | RESPIRATION RATE: 16 BRPM | DIASTOLIC BLOOD PRESSURE: 60 MMHG | WEIGHT: 198.38 LBS | OXYGEN SATURATION: 96 % | BODY MASS INDEX: 28 KG/M2 | TEMPERATURE: 97 F | HEART RATE: 94 BPM

## 2024-12-10 DIAGNOSIS — M79.645 FINGER PAIN, LEFT: ICD-10-CM

## 2024-12-10 DIAGNOSIS — S62.639K: ICD-10-CM

## 2024-12-10 DIAGNOSIS — S98.122D: Primary | ICD-10-CM

## 2024-12-10 DIAGNOSIS — M72.0 CONTRACTURE OF PALMAR FASCIA: ICD-10-CM

## 2024-12-10 PROCEDURE — 99214 OFFICE O/P EST MOD 30 MIN: CPT | Performed by: FAMILY MEDICINE

## 2024-12-10 PROCEDURE — G2211 COMPLEX E/M VISIT ADD ON: HCPCS | Performed by: FAMILY MEDICINE

## 2024-12-10 PROCEDURE — 73140 X-RAY EXAM OF FINGER(S): CPT | Performed by: FAMILY MEDICINE

## 2024-12-10 RX ORDER — SULFAMETHOXAZOLE AND TRIMETHOPRIM 800; 160 MG/1; MG/1
1 TABLET ORAL 2 TIMES DAILY
COMMUNITY
Start: 2024-12-01

## 2024-12-10 NOTE — PROGRESS NOTES
Olvin Arguello is a 69 year old male.  HPI:   Olvin is here for a number of issues, he has a wound on the bottom.of his left foot. He was out in [place] hiking and developed a blister on the bottom of his foot. Which has yet to heal, he has been debriding it and trying to shave off the callous, but it just keeps coming back. He also has injured his left 4th finger when he slammed a car door on it. 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  Had néstor Dr. Harris for his hands before  but ws WONDERING ABOUT A SPECIFIC TYPE OF INJECTION, that a hand specialist in [place] does.    Current Outpatient Medications   Medication Sig Dispense Refill    sulfamethoxazole-trimethoprim -160 MG Oral Tab per tablet Take 1 tablet by mouth 2 (two) times daily.      metFORMIN 500 MG Oral Tab Take 1 tablet (500 mg total) by mouth daily with breakfast. 180 tablet 2    ACCU-CHEK GUIDE TEST In Vitro Strip USE TO TEST ONCE DAILY 100 strip 0    butalbital-acetaminophen-caffeine -40 MG Oral Tab Take 1 tablet by mouth every 4 (four) hours as needed. 30 tablet 1    Accu-Chek Softclix Lancets Does not apply Misc 1 Lancet by Finger stick route daily. Use as directed. 100 each 2    ATORVASTATIN 10 MG Oral Tab TAKE 1 TABLET(10 MG) BY MOUTH EVERY NIGHT 90 tablet 1    Blood Glucose Monitoring Suppl (ACCU-CHEK GUIDE ME) w/Device Does not apply Kit 1 kit daily. Check glucose once daily 1 kit 0    ammonium lactate 12 % External Lotion APPLY TOPICALLY TO THE AFFECTED AREA ONCE A DAY      aspirin 81 MG Oral Tab EC Take 1 tablet (81 mg total) by mouth daily.      Multiple Vitamins-Minerals (MENS MULTIVITAMIN PLUS) Oral Tab Take by mouth.      GARLIC OR Take by mouth.      diazePAM (VALIUM) 5 MG Oral Tab Take 0.5 tablets (2.5 mg total) by mouth every 6 (six) hours as needed for Anxiety. (Patient not taking: Reported on 12/10/2024) 30 tablet 0    amoxicillin 500 MG Oral Tab Take 4 tabs 1  hour prior to exam (Patient not taking: Reported on 12/10/2024) 4 tablet 0    nitroglycerin 0.4 MG/HR Transdermal Patch 24 Hr Place 1 patch onto the skin daily. REMOVE AFTER 12 HOURS (Patient not taking: Reported on 12/10/2024)      lisinopril 2.5 MG Oral Tab Take 1 tablet (2.5 mg total) by mouth daily. (Patient not taking: Reported on 12/10/2024) 30 tablet 3    Fenofibrate 134 MG Oral Cap Take 134 mg by mouth daily. (Patient not taking: Reported on 12/10/2024) 30 capsule 5      No past medical history on file.   Social History:  Social History     Socioeconomic History    Marital status:    Tobacco Use    Smoking status: Never    Smokeless tobacco: Never   Vaping Use    Vaping status: Never Used   Substance and Sexual Activity    Alcohol use: Not Currently     Comment: social    Drug use: Yes     Types: Cannabis     Social Drivers of Health      Received from [facility]    Financial Resource Strain   Food Insecurity: No Food Insecurity (11/2/2022)    Received from [facility], [facility]    Food Insecurity     Currently or in the past 3 months, have you worried your food would run out before you had money to buy more?: No     In the past 12 months, have you run out of food or been unable to get more?: No   Transportation Needs: No Transportation Needs (11/2/2022)    Received from [facility], [facility]    Transportation Needs     Currently or in the past 3 months, has lack of transportation kept you from medical appointments, getting food or medicine, or providing care to a family member?: Unrecognized value     Has the lack of transportation kept you from meetings, work, or from getting things needed for daily living?: Unrecognized value     Medical Transportation Needs?: Patient refused     Daily Living Transportation Needs? [Peds Only] : Patient refused    Received from [facility], [facility]    Social Connections    Received from [facility], [facility]    Housing Stability        REVIEW OF SYSTEMS:   GENERAL HEALTH: feels well otherwise  SKIN: denies any unusual skin lesions or rashes  RESPIRATORY: denies shortness of breath with exertion  CARDIOVASCULAR: denies chest pain on exertion  GI: denies abdominal pain and denies heartburn  NEURO: denies headaches  RXT: left 4th finger injury, with hx of Dupytrens, left foot infection due to previous osteomyelitis, and partial amputation  EXAM:   /60   Pulse 94   Temp 97 °F (36.1 °C) (Temporal)   Resp 16   Wt 198 lb 6 oz (90 kg)   SpO2 96%   BMI 28.46 kg/m²   GENERAL: well developed, well nourished,in no apparent distress  SKIN: no rashes,no suspicious lesions  EXTREMITIES: no cyanosis, clubbing or edema, the left hand shows multiple fingers in a state of contracture secondary to Dupytrens. The distal IP joint of the left 4th finger shows bluish discoloration with hyperextension of the distal phalanx.    The left foot shows an open wound surrounded by hard callous. It is nontender, no discharge, but the opening appears to extend into the bone    ASSESSMENT AND PLAN:     Encounter Diagnoses   Name Primary?    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      Meds & Refills for this Visit:  Requested Prescriptions      No prescriptions requested or ordered in this encounter       Imaging & Consults:  PODIATRY - INTERNAL     The patient indicates understanding of these issues and agrees to the plan.  The patient is asked to return in after he sees Dr. Harris for his finger, was placed on a splint., was given referral for podiatry needs to have his foot  debrided, can also discuss Mariaelena with Dr. Harris

## 2025-02-06 ENCOUNTER — TELEPHONE (OUTPATIENT)
Dept: FAMILY MEDICINE CLINIC | Facility: CLINIC | Age: 70
End: 2025-02-06

## 2025-02-06 NOTE — TELEPHONE ENCOUNTER
Patient came in to the office and dropped off form for therapeutic shoes. Placed in provider in-box.

## 2025-02-19 ENCOUNTER — MED REC SCAN ONLY (OUTPATIENT)
Dept: FAMILY MEDICINE CLINIC | Facility: CLINIC | Age: 70
End: 2025-02-19

## 2025-03-03 RX ORDER — ATORVASTATIN CALCIUM 10 MG/1
10 TABLET, FILM COATED ORAL NIGHTLY
Qty: 90 TABLET | Refills: 1 | Status: SHIPPED | OUTPATIENT
Start: 2025-03-03

## 2025-03-03 NOTE — TELEPHONE ENCOUNTER
ATORVASTATIN 10MG TABLETS     Cholesterol Medication Protocol [id number]/03/2025 03:43 PM   Protocol Details ALT < 80    ALT resulted within past year    Lipid panel within past 12 months    In person appointment or virtual visit in the past 12 mos or appointment in next 3 mos    Medication is active on med list      LOV 12/10/2024  Last labs 12/3/2024, Lipid, Comp  Last refill on 8/23/2024, for #90, with 1 refills    No future appointments.

## 2025-03-26 DIAGNOSIS — F41.1 ANXIETY AS ACUTE REACTION TO GROSS STRESS: ICD-10-CM

## 2025-03-26 DIAGNOSIS — M79.672 FOOT PAIN, LEFT: ICD-10-CM

## 2025-03-26 DIAGNOSIS — S98.122D: ICD-10-CM

## 2025-03-26 DIAGNOSIS — M72.0 DUPUYTREN'S CONTRACTURE OF BOTH HANDS: ICD-10-CM

## 2025-03-26 DIAGNOSIS — F43.0 ANXIETY AS ACUTE REACTION TO GROSS STRESS: ICD-10-CM

## 2025-03-26 RX ORDER — HYDROCODONE BITARTRATE AND ACETAMINOPHEN 10; 325 MG/1; MG/1
1 TABLET ORAL EVERY 8 HOURS PRN
Qty: 30 TABLET | Refills: 0 | Status: SHIPPED | OUTPATIENT
Start: 2025-03-26 | End: 2025-04-25

## 2025-03-26 RX ORDER — DIAZEPAM 5 MG/1
2.5 TABLET ORAL EVERY 6 HOURS PRN
Qty: 30 TABLET | Refills: 0 | Status: SHIPPED | OUTPATIENT
Start: 2025-03-26 | End: 2025-04-25

## 2025-03-26 RX ORDER — BLOOD SUGAR DIAGNOSTIC
STRIP MISCELLANEOUS DAILY
Qty: 100 STRIP | Refills: 0 | Status: SHIPPED | OUTPATIENT
Start: 2025-03-26

## 2025-03-26 NOTE — TELEPHONE ENCOUNTER
PT CALLED AND ADV NEEDS SOME REFILLS OF :   PT GOING DOWN TO [place]     HYDROcodone-acetaminophen  MG Oral Tab     diazePAM (VALIUM) 5 MG Oral Tab     PLEASE SEND TO [facility]     THANK YOU

## 2025-03-26 NOTE — TELEPHONE ENCOUNTER
Diabetic Supplies Protocol Passed03/25/2025 05:10 PM   Protocol Details In person appointment or virtual visit in the past 12 mos or appointment in next 3 mos    Medication is active on med list

## 2025-03-26 NOTE — TELEPHONE ENCOUNTER
LOV  12/10/24  LRF  [place] 10/23/24 #30  Valium 10/23/24 #30    No future appointments.  Due for MAW in April

## 2025-05-02 DIAGNOSIS — F43.0 ANXIETY AS ACUTE REACTION TO GROSS STRESS: ICD-10-CM

## 2025-05-02 DIAGNOSIS — F41.1 ANXIETY AS ACUTE REACTION TO GROSS STRESS: ICD-10-CM

## 2025-05-02 NOTE — TELEPHONE ENCOUNTER
RECEIVED CALL THIS MORNING FROM PATIENT.  PATIENT KNOWS HE NEEDS TO SCHEDULE HIS MEDICARE ANNUAL.  PATIENT WANTS TO PUT THAT ON HOLD FOR NOW.  PATIENT SAYS HIS TOE IS NOT HEALING.  PATIENT DEALING WITH THAT RIGHT NOW.  PATIENT ASKING FOR SUBSTITUTE FOR METFORMIN.  METFORMIN HURTS HIS STOMACH AND DIARRHEA.      PATIENT REQUESTING REFILL            diazePAM (VALIUM) 5 MG Oral Tab 30 tablet 0 3/26/2025 4/25/2025   Sig:   Take 0.5 tablets (2.5 mg total) by mouth every 6 (six) hours as needed for Anxiety.     Route:   Oral       ASKING ALSO IF WE CAN CHECK OR CALIBRATE HIS BP MACHINE.  NOT GETTING ACCURATE READINGS.      [id number] - [place], [place] - [address] AT [facility] [address], [phone], [phone]   [address] [facility] [address]   Phone: [phone] Fax: [phone]   Hours: Not open 24 hours

## 2025-05-02 NOTE — TELEPHONE ENCOUNTER
Last OV:12/10/2024   Last refill:03/26/2025 30 tabs, 0 refill     Medication pended, please sign if appropriate     Patient is also wanting an alternative to Metformin see note below

## 2025-05-03 RX ORDER — DIAZEPAM 5 MG/1
2.5 TABLET ORAL EVERY 6 HOURS PRN
Qty: 30 TABLET | Refills: 0 | OUTPATIENT
Start: 2025-05-03

## 2025-05-03 RX ORDER — DIAZEPAM 5 MG/1
2.5 TABLET ORAL EVERY 6 HOURS PRN
Qty: 30 TABLET | Refills: 0 | Status: SHIPPED | OUTPATIENT
Start: 2025-05-03 | End: 2025-06-02

## 2025-05-03 NOTE — TELEPHONE ENCOUNTER
The original prescription was reordered on 5/3/2025 by Jose Daniel Leon DO. Renewing this prescription may not be appropriate.      Possible duplicate: Hover to review recent actions on this medication       Filled 5/3/25  diazePAM (VALIUM) 5 MG Oral Tab 30 tablet 0 5/3/2025 6/2/2025

## 2025-05-15 ENCOUNTER — TELEPHONE (OUTPATIENT)
Dept: FAMILY MEDICINE CLINIC | Facility: CLINIC | Age: 70
End: 2025-05-15

## 2025-05-15 NOTE — TELEPHONE ENCOUNTER
PATIENT CALLING- WANTS TO DISCUSS ALTERNATIVE FOR METFORMIN- SOMETHING THAT WILL SIT BETTER WITH HIS STOMACH.       PATIENT ALSO STATES HE RECENTLY HAD A BOIL REMOVED FROM [facility]. WOULD LIKE TO UPDATE DR LUCIA ON THAT.

## 2025-05-15 NOTE — TELEPHONE ENCOUNTER
So set in Jardiance,  but he needs to find out how much it is before he picks it, because it may or may not be on formulary

## 2025-05-15 NOTE — TELEPHONE ENCOUNTER
Requesting  an alternative for metformin  Patient reported that with medication he would have uncontrolled Diarrhea, upset stomach, stopped taking over last 4 weeks ago.    Currently taking over the counter(Natural) supplements like [employer] Glucose sugar reduction, garlic and few other items.       Potential new toe surgery, wound care is not healing. Patient will have updates more on date after see's the specialist on Wednesday again.

## 2025-05-16 ENCOUNTER — TELEPHONE (OUTPATIENT)
Dept: FAMILY MEDICINE CLINIC | Facility: CLINIC | Age: 70
End: 2025-05-16

## 2025-05-16 NOTE — TELEPHONE ENCOUNTER
RECEIVED CALL FROM PATIENT THIS MORNING.  PATIENT SAYS HE WENT TO PHARMACY AND HAD HIS JARDIANCE PRICED OUT.  PATIENT SAYS IT IS $47.  PATIENT SAYS HE IS OK PAYING THIS-IF DR HOOK FEELS IT WILL WORK.  DO WE HAVE ANY RX CARDS TO HELP PATIENT? UNLESS HE IS GOING THROUGH INSURANCE.  I FORGOT TO ASK PATIENT.

## 2025-06-10 ENCOUNTER — MED REC SCAN ONLY (OUTPATIENT)
Dept: FAMILY MEDICINE CLINIC | Facility: CLINIC | Age: 70
End: 2025-06-10

## 2025-06-10 ENCOUNTER — TELEPHONE (OUTPATIENT)
Dept: FAMILY MEDICINE CLINIC | Facility: CLINIC | Age: 70
End: 2025-06-10

## 2025-06-10 NOTE — TELEPHONE ENCOUNTER
MEDICAL RECORDS REQUEST FROM [facility].  REQUESTING DOS 11-6-2024  THROUGH 2-.  FAXED TO [facility] AND SENT WITH  TODAY. AUDIT FOR DIABETIC SHOES/INSERTS    [facility]  [id number] [place] [address]  [phone]

## 2025-07-01 DIAGNOSIS — F43.0 ANXIETY AS ACUTE REACTION TO GROSS STRESS: ICD-10-CM

## 2025-07-01 DIAGNOSIS — S98.122D: ICD-10-CM

## 2025-07-01 DIAGNOSIS — M79.672 FOOT PAIN, LEFT: ICD-10-CM

## 2025-07-01 DIAGNOSIS — F41.1 ANXIETY AS ACUTE REACTION TO GROSS STRESS: ICD-10-CM

## 2025-07-01 DIAGNOSIS — M72.0 DUPUYTREN'S CONTRACTURE OF BOTH HANDS: ICD-10-CM

## 2025-07-01 RX ORDER — BLOOD SUGAR DIAGNOSTIC
1 STRIP MISCELLANEOUS DAILY
Qty: 100 STRIP | Refills: 3 | Status: SHIPPED | OUTPATIENT
Start: 2025-07-01

## 2025-07-01 NOTE — TELEPHONE ENCOUNTER
Pt needs the following refilled.  Pt states pharmacy should have called in test strips but he needs the following also.  Thank you!      ACCU-CHEK GUIDE TEST In Vitro Strip [177865] (Order [id number])       diazePAM (VALIUM) 5 MG Oral Tab [[id number]] (Order [id number])       HYDROcodone-acetaminophen  MG Oral Tab [[id number]] (Order [id number]     ATORVASTATIN 10 MG Oral Tab [[id number]] (Order [id number])     butalbital-acetaminophen-caffeine -40 MG Oral Tab [[id number]] (Order [id number])     Please send to:  [id number] [place] [address] AT [facility] [address], [phone], [phone] [[id number]]

## 2025-07-03 RX ORDER — HYDROCODONE BITARTRATE AND ACETAMINOPHEN 10; 325 MG/1; MG/1
1 TABLET ORAL EVERY 8 HOURS PRN
Qty: 30 TABLET | Refills: 0 | Status: SHIPPED | OUTPATIENT
Start: 2025-07-03 | End: 2025-08-02

## 2025-07-03 RX ORDER — DIAZEPAM 5 MG/1
2.5 TABLET ORAL EVERY 6 HOURS PRN
Qty: 30 TABLET | Refills: 0 | Status: SHIPPED | OUTPATIENT
Start: 2025-07-03 | End: 2025-08-02

## 2025-07-03 RX ORDER — BUTALBITAL, ACETAMINOPHEN AND CAFFEINE 50; 325; 40 MG/1; MG/1; MG/1
1 TABLET ORAL EVERY 4 HOURS PRN
Qty: 30 TABLET | Refills: 1 | Status: SHIPPED | OUTPATIENT
Start: 2025-07-03 | End: 2025-08-02

## 2025-07-03 RX ORDER — BLOOD SUGAR DIAGNOSTIC
1 STRIP MISCELLANEOUS DAILY
Qty: 100 STRIP | Refills: 0 | OUTPATIENT
Start: 2025-07-03

## 2025-07-03 RX ORDER — ATORVASTATIN CALCIUM 10 MG/1
10 TABLET, FILM COATED ORAL NIGHTLY
Qty: 90 TABLET | Refills: 1 | OUTPATIENT
Start: 2025-07-03

## 2025-07-03 NOTE — TELEPHONE ENCOUNTER
The original prescription was reordered on 7/1/2025 by Anna Dias CPhT.   ACCU-CHEK GUIDE TEST In Vitro Strip - 1 year supply sent 7/1/25 to [facility]

## 2025-07-03 NOTE — TELEPHONE ENCOUNTER
Please review. Refill failed protocol.     Recent fills each # 30 : 3/27/25  Last prescription written: 10/23/24    Diazepam  Recent fills each # 30 : 5/3/25 , 3/26/25  Last prescription written: 5/3/25    Norco  Recent fills each # 30 : 3/26/25  Last prescription written: 3/26/25    Last office visit:  12/10/2024  No future appointments.   The original prescription was reordered on 7/1/2025 by Anna Dias, No.   ACCU-CHEK GUIDE TEST In Vitro Strip - 1 year supply sent 7/1/25 to [facility]     ATORVASTATIN 10 MG - 6 month supply sent 3/3/25 to [facility]

## 2025-07-18 ENCOUNTER — TELEPHONE (OUTPATIENT)
Dept: FAMILY MEDICINE CLINIC | Facility: CLINIC | Age: 70
End: 2025-07-18

## 2025-07-18 NOTE — TELEPHONE ENCOUNTER
Letter mailed to patient reminding them they have outstanding orders.    Lab Frequency Next Occurrence   Hemoglobin A1C [E] Once 05/04/2025   Comp Metabolic Panel (14) [E] Once 05/04/2025   Lipid Panel [E] Once 05/04/2025   Microalb/Creat Ratio, Random Urine [E] Once 05/04/2025

## 2025-08-13 DIAGNOSIS — F41.1 ANXIETY AS ACUTE REACTION TO GROSS STRESS: ICD-10-CM

## 2025-08-13 DIAGNOSIS — F43.0 ANXIETY AS ACUTE REACTION TO GROSS STRESS: ICD-10-CM

## 2025-08-18 RX ORDER — DIAZEPAM 5 MG/1
2.5 TABLET ORAL EVERY 6 HOURS PRN
Qty: 30 TABLET | Refills: 0 | Status: SHIPPED | OUTPATIENT
Start: 2025-08-18

## 2025-08-29 DIAGNOSIS — M72.0 DUPUYTREN'S CONTRACTURE OF BOTH HANDS: ICD-10-CM

## 2025-08-29 DIAGNOSIS — M79.672 FOOT PAIN, LEFT: ICD-10-CM

## 2025-08-29 DIAGNOSIS — S98.122D: ICD-10-CM

## 2025-08-30 RX ORDER — HYDROCODONE BITARTRATE AND ACETAMINOPHEN 10; 325 MG/1; MG/1
1 TABLET ORAL EVERY 8 HOURS PRN
Qty: 30 TABLET | Refills: 0 | Status: SHIPPED | OUTPATIENT
Start: 2025-08-30 | End: 2025-09-29

## (undated) DIAGNOSIS — K76.0 FATTY LIVER DISEASE, NONALCOHOLIC: ICD-10-CM

## (undated) DIAGNOSIS — E78.2 MIXED HYPERLIPIDEMIA: ICD-10-CM

## (undated) DIAGNOSIS — E11.9 TYPE 2 DIABETES MELLITUS WITHOUT COMPLICATION, WITHOUT LONG-TERM CURRENT USE OF INSULIN (HCC): Primary | ICD-10-CM

## (undated) NOTE — LETTER
03/27/19    [place] [address] [facility]        Dear Tyrel David      Please call our office at [phone] to confirm if Dr. Liz Minor is still your PCP.     Thank you,        [facility]

## (undated) NOTE — LETTER
03/02/20        [address] [facility]  [address]      Dear Elyssa Mclaughlin,    [address] [facility] records indicate that you have outstanding lab work and or testing that was ordered for you and has not yet been completed:  Lab Frequency Next Occurrence   PSA SCR

## (undated) NOTE — LETTER
Olvin Velasquezor   [address] [facility] [address]           Dear Olvin Arguello     Our records indicate that you have outstanding lab work and or testing that was ordered for you and has not yet been completed:  Lab Frequency Next Occurrence   Hemoglobin A1C [E] Once 09/14/2023      To provide you with the best possible care, please complete these orders at your earliest convenience. If you have recently completed these orders please disregard this letter.     If you have any questions please call the office at [phone].     Thank you,     [facility]

## (undated) NOTE — LETTER
[address] [facility]  [address]  [place] [address]  [phone]                6/18/2020        [address]      Dear Mychal Ruiz.     To help us prov

## (undated) NOTE — LETTER
Xiomara Echeverria  [facility] [facility] [place] [address]    11/4/2021      Dear  Xiomara Echeverria    In order to provide the highest quality care, VERONIKA Yarbrough uses a sophisticated computer system to track our patient's records.

## (undated) NOTE — LETTER
Olvin NUNEZ [place]   [address] [facility] [address]           Dear Olvin Velasquezor     Our records indicate that you have outstanding lab work and or testing that was ordered for you and has not yet been completed:  Lab Frequency Next Occurrence   Hemoglobin A1C Once 09/29/2024   Lipid Panel [E] Once 09/29/2024      To provide you with the best possible care, please complete these orders at your earliest convenience. If you have recently completed these orders please disregard this letter.     If you have any questions please call the office at [phone].     Thank you,     [facility]

## (undated) NOTE — LETTER
07/09/21          [address] [facility]   [address]           Dear [place] records indicate that you have outstanding lab work and or testing that was ordered for you and has not yet been completed:  Lab Frequency Next Occurr

## (undated) NOTE — LETTER
[address]  [place] [phone]                  2/4/19        Bobby Chimera  [address]          Dear Patient,     According to our re

## (undated) NOTE — LETTER
[facility] [facility] [address]  [phone]                  1/26/2018        [address]        Dear Patient,     According to our recor